# Patient Record
Sex: MALE | Race: ASIAN
[De-identification: names, ages, dates, MRNs, and addresses within clinical notes are randomized per-mention and may not be internally consistent; named-entity substitution may affect disease eponyms.]

---

## 2023-02-08 ENCOUNTER — APPOINTMENT (OUTPATIENT)
Dept: PSYCHIATRY | Facility: CLINIC | Age: 48
End: 2023-02-08

## 2023-02-08 ENCOUNTER — OUTPATIENT (OUTPATIENT)
Dept: OUTPATIENT SERVICES | Facility: HOSPITAL | Age: 48
LOS: 1 days | End: 2023-02-08
Payer: COMMERCIAL

## 2023-02-08 DIAGNOSIS — F43.10 POST-TRAUMATIC STRESS DISORDER, UNSPECIFIED: ICD-10-CM

## 2023-02-08 PROBLEM — Z00.00 ENCOUNTER FOR PREVENTIVE HEALTH EXAMINATION: Status: ACTIVE | Noted: 2023-02-08

## 2023-02-08 PROCEDURE — 90791 PSYCH DIAGNOSTIC EVALUATION: CPT

## 2023-02-08 NOTE — REASON FOR VISIT
[OK  to leave message] : OK  to leave message [Community Based Organization] : Community Based Organization [Non-[facility] Provider/Facility] : Non-[facility] Provider/Facility [Patient] : Patient [Collateral - Name/Contact Info/Relationship:___] : Collateral: [unfilled] [FreeTextEntry5] : Cantonese  [FreeTextEntry6] : Maxime  [FreeTextEntry7] : he/him/his [FreeTextEntry2] : evaluation for anxiety,depression and PTSD related to 9/11 exposure  [FreeTextEntry1] : "Since I was certified I have PTSD. I have nightmares sometimes. I have more anxiety" \par

## 2023-02-08 NOTE — HISTORY OF PRESENT ILLNESS
[Not Applicable] : Not applicable [FreeTextEntry1] : Mr. Devi identifies as a male using he/him.his pronouns. Mr. Devi is domiciled with his wife for 20 years in an apartment. 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 Mr. Devi denies any mental healt conditions prior to 9/11. Patient reports that it did not occur to him in the moment that 9/11 effected him as he reports that he was "busy". Maxime does not have any children. Mr. Devi reports that he has certified for PTSD, GERD and rhinitis. Maxime reports that he has hypertension, cholesterol and kidney cysts that is being monitored. Maxime reports that he has acid reflux, patient denies any breathing issues. Mr. Devi reports that he takes medications for each of the aforementioned ailments. He is uncertain of the names of the medications.\par Mr. Devi reports that he has a family history of dementia. Mr. Devi is an only child who was born in [place], [place]. Patient reports that he is a cigarette smoker and he smokes one pack per day. The patient is not interested in cigarette smoker. 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 The patient denies any suicidality. Patient denied a plan means and or an intent to harm himself. Patient reports that he owns a firearm and it is properly stored away. The patient denies any homicidal ideations. The patient denied any family history of suicide. The patient is currently employed as a  full-time. The patient is not on any current psychotropic medications.  [FreeTextEntry2] : The patient denied any past psychotropic hospitalizations. The patient denied any past mental health diagnosis.  [FreeTextEntry3] : The patient denied being on any past psychiatric medications.

## 2023-02-08 NOTE — DISCUSSION/SUMMARY
[Low acute suicide risk] : Low acute suicide risk [No] : No [Not clinically indicated] : Safety Plan completed/updated (for individuals at risk): Not clinically indicated [FreeTextEntry1] : Mr. Devi identifies as a male using he/him.his pronouns. Mr. Devi is domiciled with his wife for 20 years in an apartment. 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 Mr. Devi denies any mental healt conditions prior to 9/11. Patient reports that it did not occur to him in the moment that 9/11 effected him as he reports that he was "busy". Maxime does not have any children. Mr. Devi reports that he has certified for PTSD, GERD and rhinitis. Maxime reports that he has hypertension, cholesterol and kidney cysts that is being monitored. Maxime reports that he has acid reflux, patient denies any breathing issues. Mr. Devi reports that he takes medications for each of the aforementioned ailments. He is uncertain of the names of the medications.\par Mr. Devi reports that he has a family history of dementia. Mr. Devi is an only child who was born in [place], [place]. Patient reports that he is a cigarette smoker and he smokes one pack per day. The patient is not interested in cigarette smoker. 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 The patient denies any suicidality. Patient denied a plan means and or an intent to harm himself. Patient reports that he owns a firearm and it is properly stored away. The patient denies any homicidal ideations. The patient denied any family history of suicide. The patient is currently employed as a  full-time. The patient is not on any current psychotropic medications.

## 2023-02-08 NOTE — RISK ASSESSMENT
[Clinical Interview] : Clinical Interview [No] : No [Mood disorder] : mood disorder [PTSD] : PTSD [Identifies reasons for living] : identifies reasons for living [Supportive social network of family or friends] : supportive social network of family or friends [Denominational beliefs] : Methodist beliefs [Cultural, spiritual and/or moral attitudes against suicide] : cultural, spiritual and/or moral attitudes against suicide [Positive therapeutic relationships] : positive therapeutic relationships [None in the patient's lifetime] : None in the patient's lifetime [None Known] : none known [No known risk factors] : No known risk factors [Residential stability] : residential stability [Employment stability] : employment stability [Sobriety] : sobriety [Yes] : yes [de-identified] : the patient reports that he owns firearms that is stored away safely

## 2023-02-08 NOTE — ACTIVE PROBLEMS
[FreeTextEntry1] : The patient reports having depression with lack of motivation and low energy. The patient reports that he has also been having periods of anxiety that is tied to his PTSD. The patient reports having flashbacks about 9/11 as if it "happened yesterday". The patient reports having nightmares related to 9/11 and being easily triggered .

## 2023-02-08 NOTE — PSYCHOSOCIAL ASSESSMENT
[None known] : None known [All substances negative except as specified below] : All substances negative except as specified below [_____] : Route: [unfilled] [HS Diploma or GED] : HS Diploma or GED [Yes (select details below)] : Have you ever experienced this type of event? Yes [had nightmares about the event(s) or thought about the event(s) when you did not want] : had nightmares and/or unwanted thoughts about the events [tried hard not to think about the event(s) or went out of your way to avoid situations that reminded you of the event] : tried hard to avoid thinking about events or avoid situations that reminded patient of the event [has been constantly on guard, watchful, or easily startled] : has been constantly on guard, watchful, or easily startled [felt numb or detached from people, activities, or your surrounding] : has felt numb or detached from people, activities, or surroundings [Competitive and integrated employment] : Competitive and integrated employment [35 hours or more] : 35 hours or more [Financially stable] : financially stable [Client's spouse or domestic partner] : client's spouse or domestic partner [Spouse/Partner] : spouse/partner [Good] : good [Frequent Contact] : frequent contact [No] : Patient has personal representation (legal guardian, representative payee, conservatorship)? No [FreeTextEntry2] : The patient that the last time that he used the vape pen was in 2019. [FreeTextEntry3] : Mother has dementia and father is .  [felt guilty or unable to stop blaming yourself or others for the event(s) or any problems the event(s) may have caused] : has not felt guilty or unable to stop blaming self or others for event(s), or any problems the event(s) may have caused [N/A] : n/a [FreeTextEntry7] : Joyce Sheldon

## 2023-02-08 NOTE — FAMILY HISTORY
[FreeTextEntry1] : Family composition: wife, mom, aunt and one cousin in [place] only good relationship with mom. Father passed away in 2008. \par Family history and background: Patient identifies as Chinese American\par Family relationship: Patient reports that he has a good relationship with is family\par Pertinent Family Medical, MH and Substance Use History including Adult Child of Alcoholic and child of substance abuse status; history of cancer and heart disease: Patient reports having hypertension, no history of substance use, the patient denied family history of substance use, breast cancer on mothers side and thyroid cancer.\par

## 2023-02-08 NOTE — SOCIAL HISTORY
[FreeTextEntry1] : Legal Status \par Does individual Served have a Legal Guardian, rep Payee or Conservatorship?\par [ X] No\par [ ] Yes - Details: [ ] \par \par Legal Involvement history \par Does the individual have a history of or current involvement with the legal system? \par [ X] No\par [ ] Yes - Details: [ ] \par \par  Services \par Have you ever served in the ? \par [X ] No\par [ ] Yes - Details: [ ] \par \par Employment history: Patient is currently a . Patient is a member of the [employer] department for over 20 years.\par \par Developmental history: N/A \par \par Sexual hx/identity Sexual History/ Concern (include sexual orientation and other relevant information): Heterosexual cisgender male \par \par Race - ethnicity - culture information:  American born in [place] and [place]. Patient reports that he is a naturalized citizen. \par \par \par Social supports (friends, Volunteers, club, AA meeting, other meetings ) ? Patient reports that his wife, Joyce is his only social support. \par \par Meaningful Activities: video games\par \par \par Spiritual Assessment Tool - FICA\par F. What is your carmen or belief? Anglican\par Do you consider yourself spiritual or Zoroastrianism? No \par Is there something you believe in that gives meaning to your life? Joyce my wife \par I: Is it important in your life? Yes \par What influence does it have on how you take care of yourself? "I want to be a better person overall" \par How have your beliefs influenced your behavior during this illness? What role do your beliefs play in regaining your health?  "I follow the teachings of Buddha" \par C. Are you part of a spiritual or Zoroastrianism community? No \par Is this of support to you and how? N/A \par Is there a person or group of people you really love or who are really important to you? Joyce , mom and aunt \par H. We have been discussing your belief and supports. What else gives you internal support? No\par What are your sources of hope, strength, comfort and peace? "being home in my safe place" \par What do you hold on to during difficult times? No \par what sustains you and keeps you going? My wife \par A. How would you like me, your healthcare provider, to address these issues in your healthcare? Talk about it \par \par SMOKING CESSATION \par Do you Smoke?                              [ X] Yes		[ ] No\par Do you want to quit? 		[ ] Yes		[X ] No\par -ASK\par ·	Number of cigarettes   [ ] cigars [ ]  pipe bowls [20 ]  per day \par ·	Number of ST cans/ pouches per week [ 7]\par ·	Number of years used [ over 30 years ]\par ·	How soon after you wake up do you use tobacco?  [X ] within 30 minutes      [ ] more than 30 minutes \par ·	Previous quit attempts:  # of attempts [ ] longest quit period [ ] methods(s) used [ ]- Never tried to quit smoking\par How long ago was last attempt to quit  [ ] years [ ] months- NA \par ·	Reasons for wanting to quit[ ] Patient reports that he does not want to quit smoking \par -ADVISE   about the oral benefits of quitting\par -ASSESS   willingness to make a quit attempt (Stage of Change) \par 	    [ ] Precontemplation (Stop here & Reassess next visit)	[ ] Contemplation [ ] Preparation  \par -ASSIST    (depending on stage of change) \par ·	Self-Help Pamphlets & Materials\par ·	List of local community group/individual quit programs and phone help lines\par ·	Encourage a quit date (for those who are ready)\par ·	Pharmacotherapy: Nicotine gum/ Lozenge/ Patch/ Inhaler/NS/Zyban/ Chantix\par  	RX: 	[ ]  () \par -ARRANGE  Follow up if set a quit date (with permission)\par Quit Date: [ ]  Phone calls or visits:  [ ] Week 1-2  Months   [ ] 1   [ ] 3   [ ] 6   [ ] 12  \par -Yearly Reassessment    [ ] No Changes of Smoking [ ] Change of Smoking Habit (Non-Smoker to Smoker/ Smoker to Non Smoker)\par (If there is change from Non Smoker to Smoker, please fill out new BRIEF TOBACCO CESSATION INTERVENTION FORM) \par Date of Yearly Reassessment : [ ]\par Comments:  [ ]\par \par

## 2023-02-23 ENCOUNTER — OUTPATIENT (OUTPATIENT)
Dept: OUTPATIENT SERVICES | Facility: HOSPITAL | Age: 48
LOS: 1 days | End: 2023-02-23
Payer: COMMERCIAL

## 2023-02-23 ENCOUNTER — APPOINTMENT (OUTPATIENT)
Dept: PSYCHIATRY | Facility: CLINIC | Age: 48
End: 2023-02-23

## 2023-02-23 DIAGNOSIS — F43.10 POST-TRAUMATIC STRESS DISORDER, UNSPECIFIED: ICD-10-CM

## 2023-02-23 PROCEDURE — 90832 PSYTX W PT 30 MINUTES: CPT | Mod: 95

## 2023-02-23 NOTE — REASON FOR VISIT
[Patient preference] : as per patient preference [Continuing, patient seen in-person within last 12 months] : Telehealth services are continuing as patient has been seen in-person within last 12 months. [Telehealth (audio & video) - Individual/Group] : This visit was provided via telehealth using real-time 2-way audio visual technology. [Other Location: e.g. Home (Enter Location, City,State)___] : The provider was located at [unfilled]. [Home] : The patient, [unfilled], was located at home, [unfilled], at the time of the visit. [Verbal consent obtained from patient/other participant(s)] : Verbal consent for telehealth/telephonic services obtained from patient/other participant(s) [Patient] : Patient [FreeTextEntry4] : 10:09am [FreeTextEntry5] : 10:31am [FreeTextEntry2] : 2/8/2023 [FreeTextEntry3] : patient requested telehealth sessions and is appropriate for telehealth services  [FreeTextEntry1] : psychotherapy follow up

## 2023-02-23 NOTE — PLAN
[Cognitive and/or Behavior Therapy] : Cognitive and/or Behavior Therapy  [Skills training (all types)] : Skills training (all types)  [Supportive Therapy] : Supportive Therapy [FreeTextEntry2] : 1. rapport building \par 2. exploration of depression  [de-identified] : The patient attended his scheduled session with the writer via [facility]. The patient reported that he has been having a lack of energy and he has been unmotivated to anything. The writer explored these feelings further and he reported that he is not as satisfied with his job as he once was due to needing to travel further into [place] to work. The writer validated his feelings and support was rendered. The writer and the patient discussed how this can make him feel unmotivated and unfulfilled. Psychoeducation was provided to the patient regarding behavioral activation and incremental goals. This led to a discussion regarding small goals that the patient has for himself. The patient verbalized that he would like to go out with his wife. The writer commended the patient for this goal. The patient is scheduled for a psych eval with Dr. Mullen. The patient reports that he has a history of sleep apnea however, he has not been utilizing his CPAP machine. The writer strongly encouraged the patient to speak with [facility] regarding options for alternative machines. The patient is a cigarette smoker and is uninterested in smoking cessation. The writer will provide the patient with support and encouragement where needed. The patient is scheduled for a follow up visit on 3/9/2023 at 10am.  [Recommended Frequency of Visits: ____] : Recommended frequency of visits: [unfilled] [Return in ____ week(s)] : Return in [unfilled] week(s)

## 2023-02-24 ENCOUNTER — OUTPATIENT (OUTPATIENT)
Dept: OUTPATIENT SERVICES | Facility: HOSPITAL | Age: 48
LOS: 1 days | End: 2023-02-24
Payer: COMMERCIAL

## 2023-02-24 ENCOUNTER — APPOINTMENT (OUTPATIENT)
Dept: PSYCHIATRY | Facility: CLINIC | Age: 48
End: 2023-02-24
Payer: COMMERCIAL

## 2023-02-24 DIAGNOSIS — Z86.39 PERSONAL HISTORY OF OTHER ENDOCRINE, NUTRITIONAL AND METABOLIC DISEASE: ICD-10-CM

## 2023-02-24 DIAGNOSIS — Z86.79 PERSONAL HISTORY OF OTHER DISEASES OF THE CIRCULATORY SYSTEM: ICD-10-CM

## 2023-02-24 DIAGNOSIS — F43.10 POST-TRAUMATIC STRESS DISORDER, UNSPECIFIED: ICD-10-CM

## 2023-02-24 DIAGNOSIS — Z87.438 PERSONAL HISTORY OF OTHER DISEASES OF MALE GENITAL ORGANS: ICD-10-CM

## 2023-02-24 DIAGNOSIS — F33.2 MAJOR DEPRESSIVE DISORDER, RECURRENT SEVERE WITHOUT PSYCHOTIC FEATURES: ICD-10-CM

## 2023-02-24 PROCEDURE — 90792 PSYCH DIAG EVAL W/MED SRVCS: CPT

## 2023-02-24 RX ORDER — ATORVASTATIN CALCIUM 80 MG/1
80 TABLET, FILM COATED ORAL
Qty: 30 | Refills: 0 | Status: ACTIVE | COMMUNITY
Start: 2023-02-24

## 2023-02-24 RX ORDER — AMLODIPINE BESYLATE 5 MG/1
5 TABLET ORAL DAILY
Qty: 30 | Refills: 0 | Status: ACTIVE | COMMUNITY
Start: 2023-02-24

## 2023-02-24 RX ORDER — TAMSULOSIN HYDROCHLORIDE 0.4 MG/1
0.4 CAPSULE ORAL
Qty: 30 | Refills: 0 | Status: ACTIVE | COMMUNITY
Start: 2023-02-24

## 2023-02-24 NOTE — REASON FOR VISIT
[OK  to leave message] : OK  to leave message [FreeTextEntry5] : Cantonese  [FreeTextEntry6] : Maxime  [FreeTextEntry7] : he/him/his [Community Based Organization] : Community Based Organization [Non-[facility] Provider/Facility] : Non-[facility] Provider/Facility [Patient] : Patient [Collateral - Name/Contact Info/Relationship:___] : Collateral: [unfilled] [FreeTextEntry2] : evaluation for anxiety,depression and PTSD related to 9/11 exposure  [FreeTextEntry1] : "Since I was certified I have PTSD. I have nightmares sometimes. I have more anxiety" \par

## 2023-02-24 NOTE — RISK ASSESSMENT
[Clinical Interview] : Clinical Interview [No] : No [Mood disorder] : mood disorder [PTSD] : PTSD [Identifies reasons for living] : identifies reasons for living [Supportive social network of family or friends] : supportive social network of family or friends [Sabianist beliefs] : Protestant beliefs [Cultural, spiritual and/or moral attitudes against suicide] : cultural, spiritual and/or moral attitudes against suicide [Positive therapeutic relationships] : positive therapeutic relationships [None in the patient's lifetime] : None in the patient's lifetime [None Known] : none known [No known risk factors] : No known risk factors [Residential stability] : residential stability [Employment stability] : employment stability [Sobriety] : sobriety [Yes] : yes [de-identified] : the patient reports that he owns firearms that is stored away safely

## 2023-02-24 NOTE — SURGICAL HISTORY
[FreeTextEntry1] : The patient reports that he has had bunion surgery between [id number]. Patient reports having chronic foot pain.

## 2023-02-24 NOTE — PSYCHOSOCIAL ASSESSMENT
[None known] : None known [All substances negative except as specified below] : All substances negative except as specified below [_____] : Route: [unfilled] [FreeTextEntry2] : The patient that the last time that he used the vape pen was in 2019. [HS Diploma or GED] : HS Diploma or GED [FreeTextEntry3] : Mother has dementia and father is .  [Yes (select details below)] : Have you ever experienced this type of event? Yes [had nightmares about the event(s) or thought about the event(s) when you did not want] : had nightmares and/or unwanted thoughts about the events [tried hard not to think about the event(s) or went out of your way to avoid situations that reminded you of the event] : tried hard to avoid thinking about events or avoid situations that reminded patient of the event [has been constantly on guard, watchful, or easily startled] : has been constantly on guard, watchful, or easily startled [felt numb or detached from people, activities, or your surrounding] : has felt numb or detached from people, activities, or surroundings [felt guilty or unable to stop blaming yourself or others for the event(s) or any problems the event(s) may have caused] : has not felt guilty or unable to stop blaming self or others for event(s), or any problems the event(s) may have caused [Competitive and integrated employment] : Competitive and integrated employment [35 hours or more] : 35 hours or more [Financially stable] : financially stable [Client's spouse or domestic partner] : client's spouse or domestic partner [N/A] : n/a [Spouse/Partner] : spouse/partner [Good] : good [Frequent Contact] : frequent contact [No] : Patient has personal representation (legal guardian, representative payee, conservatorship)? No [FreeTextEntry7] : Joyce Sheldon

## 2023-02-24 NOTE — SOCIAL HISTORY
[FreeTextEntry1] : Legal Status \par Does individual Served have a Legal Guardian, rep Payee or Conservatorship?\par [ X] No\par [ ] Yes - Details: [ ] \par \par Legal Involvement history \par Does the individual have a history of or current involvement with the legal system? \par [ X] No\par [ ] Yes - Details: [ ] \par \par  Services \par Have you ever served in the ? \par [X ] No\par [ ] Yes - Details: [ ] \par \par Employment history: Patient is currently a . 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 Patient reports that he is a naturalized citizen. \par \par \par Social supports (friends, Volunteers, club, AA meeting, other meetings ) ? Patient reports that his wife, Joyce is his only social support. \par \par Meaningful Activities: video games\par \par \par Spiritual Assessment Tool - FICA\par F. What is your carmen or belief? Uatsdin\par Do you consider yourself spiritual or Taoist? No \par Is there something you believe in that gives meaning to your life? Joyce my wife \par I: Is it important in your life? Yes \par What influence does it have on how you take care of yourself? "I want to be a better person overall" \par How have your beliefs influenced your behavior during this illness? What role do your beliefs play in regaining your health?  "I follow the teachings of Buddha" \par C. Are you part of a spiritual or Taoist community? No \par Is this of support to you and how? N/A \par Is there a person or group of people you really love or who are really important to you? Joyce , mom and aunt \par H. We have been discussing your belief and supports. What else gives you internal support? No\par What are your sources of hope, strength, comfort and peace? "being home in my safe place" \par What do you hold on to during difficult times? No \par what sustains you and keeps you going? My wife \par A. How would you like me, your healthcare provider, to address these issues in your healthcare? Talk about it \par \par SMOKING CESSATION \par Do you Smoke?                              [ X] Yes		[ ] No\par Do you want to quit? 		[ ] Yes		[X ] No\par -ASK\par ·	Number of cigarettes   [ ] cigars [ ]  pipe bowls [20 ]  per day \par ·	Number of ST cans/ pouches per week [ 7]\par ·	Number of years used [ over 30 years ]\par ·	How soon after you wake up do you use tobacco?  [X ] within 30 minutes      [ ] more than 30 minutes \par ·	Previous quit attempts:  # of attempts [ ] longest quit period [ ] methods(s) used [ ]- Never tried to quit smoking\par How long ago was last attempt to quit  [ ] years [ ] months- NA \par ·	Reasons for wanting to quit[ ] Patient reports that he does not want to quit smoking \par -ADVISE   about the oral benefits of quitting\par -ASSESS   willingness to make a quit attempt (Stage of Change) \par 	    [ ] Precontemplation (Stop here & Reassess next visit)	[ ] Contemplation [ ] Preparation  \par -ASSIST    (depending on stage of change) \par ·	Self-Help Pamphlets & Materials\par ·	List of local community group/individual quit programs and phone help lines\par ·	Encourage a quit date (for those who are ready)\par ·	Pharmacotherapy: Nicotine gum/ Lozenge/ Patch/ Inhaler/NS/Zyban/ Chantix\par  	RX: 	[ ]  () \par -ARRANGE  Follow up if set a quit date (with permission)\par Quit Date: [ ]  Phone calls or visits:  [ ] Week 1-2  Months   [ ] 1   [ ] 3   [ ] 6   [ ] 12  \par -Yearly Reassessment    [ ] No Changes of Smoking [ ] Change of Smoking Habit (Non-Smoker to Smoker/ Smoker to Non Smoker)\par (If there is change from Non Smoker to Smoker, please fill out new BRIEF TOBACCO CESSATION INTERVENTION FORM) \par Date of Yearly Reassessment : [ ]\par Comments:  [ ]\par \par

## 2023-02-24 NOTE — HISTORY OF PRESENT ILLNESS
[FreeTextEntry1] : Pt came to his intake with his wife and she stayed in the room throughout of the intake session at pt's request.\levi Pt is a poor historian. Constantly asks his wife to answer my questions. 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 Pt denies any mental health conditions prior to 9/11), no prior IPP, no SA, never on meds, c/o feeling depressed, having anhedonia, apathy, agoraphobia, flashbacks, hypervigilance, etc. Pt was Dxd with PTSD by 9/11 [facility] and referred to us for treatment. Denies MELINA.\par Denies SI/HI/AH/PI. Pt wants to be started on meds. Risks and benefits of prozac were d/w  [FreeTextEntry2] : see above [FreeTextEntry3] : none

## 2023-02-24 NOTE — PHYSICAL EXAM
[None] : none [Intermittent] : intermittent [Slowed] : slowed [Depressed] : depressed [Anxious] : anxious [Constricted] : constricted [Underproductive] : underproductive [Soft] : soft [[place]] : concrete [WNL] : within normal limits [None Reported] : none reported [Moderate] : moderate [FreeTextEntry1] : overweight. Immature [FreeTextEntry5] : indifferent [de-identified] : poor

## 2023-02-24 NOTE — DISCUSSION/SUMMARY
[Low acute suicide risk] : Low acute suicide risk [FreeTextEntry1] : Pt came to his intake with his wife and she stayed in the room throughout of the intake session at pt's request.\par Pt is a poor historian. Constantly asks his wife to answer my questions. 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 Pt denies any mental health conditions prior to 9/11), no prior IPP, no SA, never on meds, c/o feeling depressed, having anhedonia, apathy, agoraphobia, flashbacks, hypervigilance, etc. Pt was Dxd with PTSD by 9/11 [facility] and referred to us for treatment. Denies MELINA.\par Denies SI/HI/AH/PI. Pt wants to be started on meds. Risks and benefits of prozac were d/w \par 1. Next appt in 2w tele\par 2. Does he tolerate prozac. Increase\par 3. does he have hightmares (does he need prazosin) [No] : No [Not clinically indicated] : Safety Plan completed/updated (for individuals at risk): Not clinically indicated

## 2023-02-24 NOTE — FAMILY HISTORY
[FreeTextEntry1] : Family composition: wife, mom, aunt and one cousin in [place] only good relationship with mom. Father passed away in 2008. \par Family history and background: Patient identifies as Chinese American\par Family relationship: Patient reports that he has a good relationship with is family\par Pertinent Family Medical, MH and Substance Use History including Adult Child of Alcoholic and child of substance abuse status; history of cancer and heart disease: Patient reports having hypertension, no history of substance use, the patient denied family history of substance use, breast cancer on mothers side and thyroid cancer.\par

## 2023-02-27 ENCOUNTER — NON-APPOINTMENT (OUTPATIENT)
Age: 48
End: 2023-02-27

## 2023-02-27 RX ORDER — FLUOXETINE HYDROCHLORIDE 20 MG/1
20 TABLET ORAL DAILY
Qty: 30 | Refills: 1 | Status: DISCONTINUED | COMMUNITY
Start: 2023-02-24 | End: 2023-02-27

## 2023-02-28 DIAGNOSIS — F43.10 POST-TRAUMATIC STRESS DISORDER, UNSPECIFIED: ICD-10-CM

## 2023-03-09 ENCOUNTER — APPOINTMENT (OUTPATIENT)
Dept: PSYCHIATRY | Facility: CLINIC | Age: 48
End: 2023-03-09

## 2023-03-09 ENCOUNTER — OUTPATIENT (OUTPATIENT)
Dept: OUTPATIENT SERVICES | Facility: HOSPITAL | Age: 48
LOS: 1 days | End: 2023-03-09
Payer: COMMERCIAL

## 2023-03-09 DIAGNOSIS — F43.10 POST-TRAUMATIC STRESS DISORDER, UNSPECIFIED: ICD-10-CM

## 2023-03-09 PROCEDURE — 90832 PSYTX W PT 30 MINUTES: CPT | Mod: 95

## 2023-03-09 NOTE — PLAN
[FreeTextEntry2] : 1. rapport building \par 2. exploration of depression  [Cognitive and/or Behavior Therapy] : Cognitive and/or Behavior Therapy  [Skills training (all types)] : Skills training (all types)  [Supportive Therapy] : Supportive Therapy [de-identified] : The patient attended his scheduled session with the writer via [employer].  The patient completed his psych eval with Dr. Mullen and reports that he has been taking his prescribed psychotropic medication and he has not noticed any positive changes to his mood. Psychoeducation was provided to the patient that the medication may take a few weeks to start truly working. The patient and the writer discussed ways that the patient has been able to complete  behavioral activation. Patient reported that his work schedule has not permitted him to complete any goals for himself. Writer and patient discussed ways that the patient can implement self care while he is working including taking a set lunch break. \par Patient explained that he "got into it" with one of his co workers. Patient reported that he was able to walk away. The writer commended the patient. The patient and the writer discussed further anger management techniques. The writer will continue to provide the patient with support and encouragement where needed. The patient is scheduled for a follow up visit on 3/23/2023 at 10am. Initial treatment plan discussed with the patient.  [Recommended Frequency of Visits: ____] : Recommended frequency of visits: [unfilled] [Return in ____ week(s)] : Return in [unfilled] week(s)

## 2023-03-09 NOTE — DISCUSSION/SUMMARY
[Initial Plan] : Initial Plan [Able to manage surrounding demands and opportunities] : able to manage surrounding demands and opportunities [Able to exercise self-direction] : able to exercise self-direction [Able to set and pursue goals] : able to set and pursue goals [Adherent to treatment recommendations] : adherent to treatment recommendations [Articulate] : articulate [Attempting to realize their potential] : Attempting to realize their potential [Cognitively intact] : cognitively intact [Good impulse control] : good impulse control [Insightful] : insightful [Creative] : creative [Intelligent] : intelligent [Motivated to participate in treatment] : motivated to participate in treatment [Motivated and ready for change] : motivated and ready for change [Health literacy] : health literacy [Financially stable] : financially stable [Part of a supportive marriage] : part of a supportive marriage [Part of a supportive family] : part of a supportive family [Steady employment] : steady employment [Housing stability] : housing stability [English fluency] : English fluency [Connected to healthcare] : connected to healthcare [Access to safe outdoor spaces] : access to safe outdoor spaces [Social supports] : social supports [FreeTextEntry2] : 3/8/2024 [FreeTextEntry3] : 2/24/2023 [FreeTextEntry8] : N/A [FreeTextEntry9] : Patient identifies as a Sabianist  [de-identified] : N/A [Mental Health] : Mental Health [Initial] : Initial [every ___ months] : every [unfilled] months [every ___ weeks] : every [unfilled] weeks [Improvement in symptoms as evidenced by:] : Improvement in symptoms as evidenced by: [Attainment of higher level of functioning as evidenced by:] : Attainment of higher level of functioning as evidenced by: [None - Reason others did not participate:] : None - Reason others did not participate:  [Yes] : Yes [Psychiatric Provider/Prescriber] : Psychiatric Provider/Prescriber [Therapist] : Therapist [Supervisor (if needed)] : Supervisor [de-identified] : The patient will attend medication management appointments and will take his psychotropic medications as prescribed   [de-identified] : 3/8/2024 [FreeTextEntry1] : Management of anxiety  [FreeTextEntry4] : "I would like to work on my depression"  [de-identified] : The patient will verbalize three triggers for anxiety and will verbalize and utilize at least three effective coping mechanisms for anxiety  [de-identified] : 3/8/2024 [FreeTextEntry5] : The writer will utilize CBT techniques and supportive therapy  [de-identified] :  The patient expresses improvement in performing activities of daily living and/or says progress with initial complaint symptoms. In addition, the treatment team will conduct diagnose-based screenings as needed. [de-identified] : The patient expresses improvement in performing activities of daily living and/or says progress with initial complaint symptoms. In addition, the treatment team will conduct diagnose-based screenings as needed.\par  [de-identified] : not clinically indicated

## 2023-03-09 NOTE — REASON FOR VISIT
[Patient preference] : as per patient preference [Continuing, patient seen in-person within last 12 months] : Telehealth services are continuing as patient has been seen in-person within last 12 months. [Telehealth (audio & video) - Individual/Group] : This visit was provided via telehealth using real-time 2-way audio visual technology. [Other Location: e.g. Home (Enter Location, City,State)___] : The provider was located at [unfilled]. [Home] : The patient, [unfilled], was located at home, [unfilled], at the time of the visit. [Verbal consent obtained from patient/other participant(s)] : Verbal consent for telehealth/telephonic services obtained from patient/other participant(s) [FreeTextEntry4] : 10am [FreeTextEntry5] : 10:22am  [FreeTextEntry2] : 2/24/2023 [FreeTextEntry3] : patient requested telehealth sessions and is appropriate for telehealth services  [Patient] : Patient [FreeTextEntry1] : psychotherapy follow up

## 2023-03-09 NOTE — DISCUSSION/SUMMARY
[Initial Plan] : Initial Plan [Able to manage surrounding demands and opportunities] : able to manage surrounding demands and opportunities [Able to exercise self-direction] : able to exercise self-direction [Able to set and pursue goals] : able to set and pursue goals [Adherent to treatment recommendations] : adherent to treatment recommendations [Articulate] : articulate [Attempting to realize their potential] : Attempting to realize their potential [Cognitively intact] : cognitively intact [Good impulse control] : good impulse control [Insightful] : insightful [Creative] : creative [Intelligent] : intelligent [Motivated to participate in treatment] : motivated to participate in treatment [Motivated and ready for change] : motivated and ready for change [Health literacy] : health literacy [Financially stable] : financially stable [Part of a supportive marriage] : part of a supportive marriage [Part of a supportive family] : part of a supportive family [Steady employment] : steady employment [Housing stability] : housing stability [English fluency] : English fluency [Connected to healthcare] : connected to healthcare [Access to safe outdoor spaces] : access to safe outdoor spaces [Social supports] : social supports [FreeTextEntry2] : 3/8/2024 [FreeTextEntry3] : 2/24/2023 [FreeTextEntry8] : N/A [FreeTextEntry9] : Patient identifies as a Gnosticism  [de-identified] : N/A [Mental Health] : Mental Health [Initial] : Initial [every ___ months] : every [unfilled] months [every ___ weeks] : every [unfilled] weeks [Improvement in symptoms as evidenced by:] : Improvement in symptoms as evidenced by: [Attainment of higher level of functioning as evidenced by:] : Attainment of higher level of functioning as evidenced by: [None - Reason others did not participate:] : None - Reason others did not participate:  [Yes] : Yes [Psychiatric Provider/Prescriber] : Psychiatric Provider/Prescriber [Therapist] : Therapist [Supervisor (if needed)] : Supervisor [de-identified] : The patient will attend medication management appointments and will take his psychotropic medications as prescribed   [de-identified] : 3/8/2024 [FreeTextEntry1] : Management of anxiety  [FreeTextEntry4] : "I would like to work on my depression"  [de-identified] : The patient will verbalize three triggers for anxiety and will verbalize and utilize at least three effective coping mechanisms for anxiety  [de-identified] : 3/8/2024 [FreeTextEntry5] : The writer will utilize CBT techniques and supportive therapy  [de-identified] :  The patient expresses improvement in performing activities of daily living and/or says progress with initial complaint symptoms. In addition, the treatment team will conduct diagnose-based screenings as needed. [de-identified] : The patient expresses improvement in performing activities of daily living and/or says progress with initial complaint symptoms. In addition, the treatment team will conduct diagnose-based screenings as needed.\par  [de-identified] : not clinically indicated

## 2023-03-09 NOTE — PLAN
[FreeTextEntry2] : 1. rapport building \par 2. exploration of depression  [Cognitive and/or Behavior Therapy] : Cognitive and/or Behavior Therapy  [Skills training (all types)] : Skills training (all types)  [Supportive Therapy] : Supportive Therapy [de-identified] : The patient attended his scheduled session with the writer via [employer].  The patient completed his psych eval with Dr. Mullen and reports that he has been taking his prescribed psychotropic medication and he has not noticed any positive changes to his mood. Psychoeducation was provided to the patient that the medication may take a few weeks to start truly working. The patient and the writer discussed ways that the patient has been able to complete  behavioral activation. Patient reported that his work schedule has not permitted him to complete any goals for himself. Writer and patient discussed ways that the patient can implement self care while he is working including taking a set lunch break. \par Patient explained that he "got into it" with one of his co workers. Patient reported that he was able to walk away. The writer commended the patient. The patient and the writer discussed further anger management techniques. The writer will continue to provide the patient with support and encouragement where needed. The patient is scheduled for a follow up visit on 3/23/2023 at 10am. Initial treatment plan discussed with the patient.  [Recommended Frequency of Visits: ____] : Recommended frequency of visits: [unfilled] [Return in ____ week(s)] : Return in [unfilled] week(s)

## 2023-03-16 ENCOUNTER — OUTPATIENT (OUTPATIENT)
Dept: OUTPATIENT SERVICES | Facility: HOSPITAL | Age: 48
LOS: 1 days | End: 2023-03-16
Payer: COMMERCIAL

## 2023-03-16 ENCOUNTER — APPOINTMENT (OUTPATIENT)
Dept: PSYCHIATRY | Facility: CLINIC | Age: 48
End: 2023-03-16
Payer: COMMERCIAL

## 2023-03-16 DIAGNOSIS — F43.10 POST-TRAUMATIC STRESS DISORDER, UNSPECIFIED: ICD-10-CM

## 2023-03-16 PROCEDURE — 99214 OFFICE O/P EST MOD 30 MIN: CPT | Mod: 95

## 2023-03-16 NOTE — DISCUSSION/SUMMARY
[FreeTextEntry1] : Solo/tele F/U visit for 30mins\par Pt reports being c/w meds and denies having side effects. He goes to work now and comes home very tired. Doesn't think that meds work yet, but says that he stopped eating all day. His sleep varies. He still experience nightmares, but says that he doesn't want meds for this matter now. Reports having anxiety when he drives and endorses to having agoraphobia.\par Denies SI/HI//AH/PI. 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 Pt denies any mental health conditions prior to 9/11), no prior IPP, no SA, never on meds, c/o feeling depressed, having anhedonia, apathy, agoraphobia, flashbacks, hypervigilance, etc. Pt was Dxd with PTSD by 9/11 [facility] and referred to us for treatment. Denies MELINA.\par Denies SI/HI/AH/PI. Pt wants to be started on meds. Risks and benefits of prozac were d/w \par 1. Next appt in 2w tele\par 2. Does he tolerate 40 mg prozac.Don't increase this time\par 3. does he have hightmares (does he need prazosin)\par 4. Depression and anxiety the same? Agoraphobia? [Low acute suicide risk] : Low acute suicide risk [No] : No [Not clinically indicated] : Safety Plan completed/updated (for individuals at risk): Not clinically indicated

## 2023-03-16 NOTE — SURGICAL HISTORY
[FreeTextEntry1] : The patient reports that he has had bunion surgery between 2509-2703. Patient reports having chronic foot pain.  Glycopyrrolate Pregnancy And Lactation Text: This medication is Pregnancy Category B and is considered safe during pregnancy. It is unknown if it is excreted breast milk.

## 2023-03-16 NOTE — FAMILY HISTORY
[FreeTextEntry1] : Family composition: wife, mom, aunt and one cousin in [place] only good relationship with mom. Father passed away in 2008. \par Family history and background: Patient identifies as Chinese American\par Family relationship: Patient reports that he has a good relationship with is family\par Pertinent Family Medical, MH and Substance Use History including Adult Child of Alcoholic and child of substance abuse status; history of cancer and heart disease: Patient reports having hypertension, no history of substance use, the patient denied family history of substance use, breast cancer on mothers side and thyroid cancer.\par

## 2023-03-16 NOTE — PHYSICAL EXAM
[None] : none [Average] : average [Intermittent] : intermittent [Slowed] : slowed [Cooperative] : cooperative [Depressed] : depressed [Anxious] : anxious [Constricted] : constricted [Clear] : clear [Linear/Goal Directed] : linear/goal directed [WNL] : within normal limits [None Reported] : none reported [Mild] : mild [Moderate] : moderate [de-identified] : poor

## 2023-03-16 NOTE — SOCIAL HISTORY
[FreeTextEntry1] : Legal Status \par Does individual Served have a Legal Guardian, rep Payee or Conservatorship?\par [ X] No\par [ ] Yes - Details: [ ] \par \par Legal Involvement history \par Does the individual have a history of or current involvement with the legal system? \par [ X] No\par [ ] Yes - Details: [ ] \par \par  Services \par Have you ever served in the ? \par [X ] No\par [ ] Yes - Details: [ ] \par \par Employment history: Patient is currently a . 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 Patient reports that he is a naturalized citizen. \par \par \par Social supports (friends, Volunteers, club, AA meeting, other meetings ) ? Patient reports that his wife, Joyce is his only social support. \par \par Meaningful Activities: video games\par \par \par Spiritual Assessment Tool - FICA\par F. What is your carmen or belief? Scientologist\par Do you consider yourself spiritual or Mosque? No \par Is there something you believe in that gives meaning to your life? Joyce my wife \par I: Is it important in your life? Yes \par What influence does it have on how you take care of yourself? "I want to be a better person overall" \par How have your beliefs influenced your behavior during this illness? What role do your beliefs play in regaining your health?  "I follow the teachings of Buddha" \par C. Are you part of a spiritual or Mosque community? No \par Is this of support to you and how? N/A \par Is there a person or group of people you really love or who are really important to you? Joyce , mom and aunt \par H. We have been discussing your belief and supports. What else gives you internal support? No\par What are your sources of hope, strength, comfort and peace? "being home in my safe place" \par What do you hold on to during difficult times? No \par what sustains you and keeps you going? My wife \par A. How would you like me, your healthcare provider, to address these issues in your healthcare? Talk about it \par \par SMOKING CESSATION \par Do you Smoke?                              [ X] Yes		[ ] No\par Do you want to quit? 		[ ] Yes		[X ] No\par -ASK\par ·	Number of cigarettes   [ ] cigars [ ]  pipe bowls [20 ]  per day \par ·	Number of ST cans/ pouches per week [ 7]\par ·	Number of years used [ over 30 years ]\par ·	How soon after you wake up do you use tobacco?  [X ] within 30 minutes      [ ] more than 30 minutes \par ·	Previous quit attempts:  # of attempts [ ] longest quit period [ ] methods(s) used [ ]- Never tried to quit smoking\par How long ago was last attempt to quit  [ ] years [ ] months- NA \par ·	Reasons for wanting to quit[ ] Patient reports that he does not want to quit smoking \par -ADVISE   about the oral benefits of quitting\par -ASSESS   willingness to make a quit attempt (Stage of Change) \par 	    [ ] Precontemplation (Stop here & Reassess next visit)	[ ] Contemplation [ ] Preparation  \par -ASSIST    (depending on stage of change) \par ·	Self-Help Pamphlets & Materials\par ·	List of local community group/individual quit programs and phone help lines\par ·	Encourage a quit date (for those who are ready)\par ·	Pharmacotherapy: Nicotine gum/ Lozenge/ Patch/ Inhaler/NS/Zyban/ Chantix\par  	RX: 	[ ]  () \par -ARRANGE  Follow up if set a quit date (with permission)\par Quit Date: [ ]  Phone calls or visits:  [ ] Week 1-2  Months   [ ] 1   [ ] 3   [ ] 6   [ ] 12  \par -Yearly Reassessment    [ ] No Changes of Smoking [ ] Change of Smoking Habit (Non-Smoker to Smoker/ Smoker to Non Smoker)\par (If there is change from Non Smoker to Smoker, please fill out new BRIEF TOBACCO CESSATION INTERVENTION FORM) \par Date of Yearly Reassessment : [ ]\par Comments:  [ ]\par \par

## 2023-03-16 NOTE — HISTORY OF PRESENT ILLNESS
[FreeTextEntry1] : Solo/tele F/U visit for 30mins\par Pt reports being c/w meds and denies having side effects. He goes to work now and comes home very tired. Doesn't think that meds work yet, but says that he stopped eating all day. His sleep varies. He still experience nightmares, but says that he doesn't want meds for this matter now. Reports having anxiety when he drives and endorses to having agoraphobia.\par Denies SI/HI//AH/PI. Denies drugs and ETOH.\par Education/supportive/skill therapy are provided. [FreeTextEntry2] : see above [FreeTextEntry3] : none

## 2023-03-16 NOTE — REASON FOR VISIT
[OK  to leave message] : OK  to leave message [FreeTextEntry5] : Cantonese  [FreeTextEntry6] : Maxime  [FreeTextEntry7] : he/him/his [Community Based Organization] : Community Based Organization [Non-[facility] Health Provider/Facility] : Non-[facility] Health Provider/Facility [Patient] : Patient [Collateral - Name/Contact Info/Relationship:___] : Collateral: [unfilled] [FreeTextEntry2] : evaluation for anxiety,depression and PTSD related to 9/11 exposure  [FreeTextEntry1] : "Since I was certified I have PTSD. I have nightmares sometimes. I have more anxiety" \par

## 2023-03-16 NOTE — RISK ASSESSMENT
[No, patient denies ideation or behavior] : No, patient denies ideation or behavior [Clinical Interview] : Clinical Interview [No] : No [Mood disorder] : mood disorder [PTSD] : PTSD [Identifies reasons for living] : identifies reasons for living [Supportive social network of family or friends] : supportive social network of family or friends [Scientology beliefs] : Adventist beliefs [Cultural, spiritual and/or moral attitudes against suicide] : cultural, spiritual and/or moral attitudes against suicide [Positive therapeutic relationships] : positive therapeutic relationships [None in the patient's lifetime] : None in the patient's lifetime [None Known] : none known [No known risk factors] : No known risk factors [Residential stability] : residential stability [Employment stability] : employment stability [Sobriety] : sobriety [Yes] : yes [de-identified] : the patient reports that he owns firearms that is stored away safely

## 2023-03-17 DIAGNOSIS — F43.10 POST-TRAUMATIC STRESS DISORDER, UNSPECIFIED: ICD-10-CM

## 2023-03-23 ENCOUNTER — APPOINTMENT (OUTPATIENT)
Dept: PSYCHIATRY | Facility: CLINIC | Age: 48
End: 2023-03-23

## 2023-03-23 NOTE — DISCUSSION/SUMMARY
[FreeTextEntry1] : Patient's wife called and stated that patient would not be able to attend scheduled appointment with the writer. A reason was not provided. The patient's appointment has been rescheduled for 3/24/2023 at 12pm.

## 2023-03-24 ENCOUNTER — OUTPATIENT (OUTPATIENT)
Dept: OUTPATIENT SERVICES | Facility: HOSPITAL | Age: 48
LOS: 1 days | End: 2023-03-24
Payer: COMMERCIAL

## 2023-03-24 ENCOUNTER — APPOINTMENT (OUTPATIENT)
Dept: PSYCHIATRY | Facility: CLINIC | Age: 48
End: 2023-03-24

## 2023-03-24 DIAGNOSIS — F43.10 POST-TRAUMATIC STRESS DISORDER, UNSPECIFIED: ICD-10-CM

## 2023-03-24 PROCEDURE — 90832 PSYTX W PT 30 MINUTES: CPT | Mod: 95

## 2023-03-24 NOTE — PLAN
[Cognitive and/or Behavior Therapy] : Cognitive and/or Behavior Therapy  [Skills training (all types)] : Skills training (all types)  [Supportive Therapy] : Supportive Therapy [Recommended Frequency of Visits: ____] : Recommended frequency of visits: [unfilled] [Return in ____ week(s)] : Return in [unfilled] week(s) [FreeTextEntry2] : Goal #1- Management of depression \par Obj #1- The patient will identify three triggers for his depression and will verbalize and utilize three effective coping mechanisms \par Obj #2-  The patient will attend medication management appointments and will take his psychotropic medications as prescribed \par \par Goal #2- Management of anxiety \par Obj #2-  The patient will verbalize three triggers for anxiety and will verbalize and utilize at least three effective coping mechanisms for anxiety [de-identified] : The patient attended his scheduled session with the writer via [employer]. The patient explained that he has been doing well and there has not been any changes to his anxiety and or depression. The patient noted some mood improvement with the initiation of his psychotropic medication. The patient noted that he had an argument with his supervisor and he was able to walk away and not continue engaging. The writer commended the patient. 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 The patient and the writer discussed taking a proper lunch break. Patient noted some barriers to this. The patient explained that he has begun the process of planning a vacation with him and his wife. Patient explained that he is hopeful that some time away from work will be good for him. This led to a discussion regarding how planned activity can help him to have something to look forward to. Ongoing behavioral activation was discussed with the patient. The patient reports medication adherence. The writer will continue to provide the patient with support and encouragement where needed. The patient is scheduled for a follow up visit on 4/13/2023 at 10:30am. The writer did not have the patient's preferred time frame available.

## 2023-03-24 NOTE — REASON FOR VISIT
[Patient preference] : as per patient preference [Continuing, patient seen in-person within last 12 months] : Telehealth services are continuing as patient has been seen in-person within last 12 months. [Telehealth (audio & video) - Individual/Group] : This visit was provided via telehealth using real-time 2-way audio visual technology. [Medical Office: ([facility])___] : The provider was located at the medical office in [unfilled]. [Home] : The patient, [unfilled], was located at home, [unfilled], at the time of the visit. [Verbal consent obtained from patient/other participant(s)] : Verbal consent for telehealth/telephonic services obtained from patient/other participant(s) [Patient] : Patient [FreeTextEntry4] : 12:12pm  [FreeTextEntry5] : 12:33pm  [FreeTextEntry3] : patient requested telehealth sessions and is appropriate for telehealth services  [FreeTextEntry2] : 2/24/2023 [FreeTextEntry1] : psychotherapy follow up

## 2023-03-30 ENCOUNTER — OUTPATIENT (OUTPATIENT)
Dept: OUTPATIENT SERVICES | Facility: HOSPITAL | Age: 48
LOS: 1 days | End: 2023-03-30
Payer: COMMERCIAL

## 2023-03-30 ENCOUNTER — APPOINTMENT (OUTPATIENT)
Dept: PSYCHIATRY | Facility: CLINIC | Age: 48
End: 2023-03-30
Payer: COMMERCIAL

## 2023-03-30 DIAGNOSIS — F43.10 POST-TRAUMATIC STRESS DISORDER, UNSPECIFIED: ICD-10-CM

## 2023-03-30 PROCEDURE — 99214 OFFICE O/P EST MOD 30 MIN: CPT | Mod: 95

## 2023-03-30 NOTE — PHYSICAL EXAM
[None] : none [Average] : average [Intermittent] : intermittent [Slowed] : slowed [Cooperative] : cooperative [Depressed] : depressed [Anxious] : anxious [Constricted] : constricted [Clear] : clear [Linear/Goal Directed] : linear/goal directed [WNL] : within normal limits [None Reported] : none reported [Mild] : mild [Moderate] : moderate [de-identified] : poor

## 2023-03-30 NOTE — SURGICAL HISTORY
[FreeTextEntry1] : The patient reports that he has had bunion surgery between [id number]. Patient reports having chronic foot pain.

## 2023-03-30 NOTE — REASON FOR VISIT
[OK  to leave message] : OK  to leave message [FreeTextEntry5] : Cantonese  [FreeTextEntry6] : Maxime  [FreeTextEntry7] : he/him/his [Community Based Organization] : Community Based Organization [Non-[facility] Provider/Facility] : Non-[facility] Provider/Facility [Patient] : Patient [Collateral - Name/Contact Info/Relationship:___] : Collateral: [unfilled] [FreeTextEntry2] : evaluation for anxiety,depression and PTSD related to 9/11 exposure  [FreeTextEntry1] : "Since I was certified I have PTSD. I have nightmares sometimes. I have more anxiety" \par

## 2023-03-30 NOTE — RISK ASSESSMENT
[No, patient denies ideation or behavior] : No, patient denies ideation or behavior [Clinical Interview] : Clinical Interview [No] : No [Mood disorder] : mood disorder [PTSD] : PTSD [Identifies reasons for living] : identifies reasons for living [Supportive social network of family or friends] : supportive social network of family or friends [Rastafarian beliefs] : Moravian beliefs [Cultural, spiritual and/or moral attitudes against suicide] : cultural, spiritual and/or moral attitudes against suicide [Positive therapeutic relationships] : positive therapeutic relationships [None in the patient's lifetime] : None in the patient's lifetime [None Known] : none known [No known risk factors] : No known risk factors [Residential stability] : residential stability [Employment stability] : employment stability [Sobriety] : sobriety [Yes] : yes [de-identified] : the patient reports that he owns firearms that is stored away safely

## 2023-03-30 NOTE — FAMILY HISTORY
[FreeTextEntry1] : Family composition: wife, mom, aunt and one cousin in [place] only good relationship with mom. Father passed away in 2008. \par Family history and background: Patient identifies as Chinese American\par Family relationship: Patient reports that he has a good relationship with is family\par Pertinent Family Medical, MH and Substance Use History including Adult Child of Alcoholic and child of substance abuse status; history of cancer and heart disease: Patient reports having hypertension, no history of substance use, the patient denied family history of substance use, breast cancer on mothers side and thyroid cancer.\par

## 2023-03-30 NOTE — DISCUSSION/SUMMARY
[FreeTextEntry1] : Solo/tele F/U visit for 30mins\par Pt spoke about his job and his problems with a new supervisor, who was very rude to the pt, yelled at him. Pt felt very stressed after that  and even needed to take 2 days off to avoid dealing with the supervisor. He will go back to work on saturday and sunday, just to work with a different supervisor. Pt also had nightmares related to this situation. Supportive and skill therapy were discussed with the pt. \par Pt reports being c/w meds and denies having side effects.  Doesn't think that meds work yet, but says that he stopped eating all day. His sleep varies. He still experience nightmares, but says that he doesn't want meds for this matter now. Reports having anxiety when he drives and endorses to having agoraphobia.\par Denies SI/HI//AH/PI. 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 Pt denies any mental health conditions prior to 9/11), no prior IPP, no SA, never on meds, c/o feeling depressed, having anhedonia, apathy, agoraphobia, flashbacks, hypervigilance, etc. Pt was Dxd with PTSD by 9/11 [facility] and referred to us for treatment. Denies MELINA.\par  Pt wants to be started on meds. Risks and benefits of prozac were d/w \par 1. Next appt in 1m tele\par 2. Does he tolerate 40 mg prozac.Increase?\par 3. 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 Depression and anxiety the same? Agoraphobia?\par 5. How is his new supervisor (he was screaming at the pt) [Low acute suicide risk] : Low acute suicide risk [No] : No [Not clinically indicated] : Safety Plan completed/updated (for individuals at risk): Not clinically indicated

## 2023-03-30 NOTE — SOCIAL HISTORY
[FreeTextEntry1] : Legal Status \par Does individual Served have a Legal Guardian, rep Payee or Conservatorship?\par [ X] No\par [ ] Yes - Details: [ ] \par \par Legal Involvement history \par Does the individual have a history of or current involvement with the legal system? \par [ X] No\par [ ] Yes - Details: [ ] \par \par  Services \par Have you ever served in the ? \par [X ] No\par [ ] Yes - Details: [ ] \par \par Employment history: Patient is currently a . 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 Patient reports that he is a naturalized citizen. \par \par \par Social supports (friends, Volunteers, club, AA meeting, other meetings ) ? Patient reports that his wife, Joyce is his only social support. \par \par Meaningful Activities: video games\par \par \par Spiritual Assessment Tool - FICA\par F. What is your carmen or belief? Tenriism\par Do you consider yourself spiritual or Rastafarian? No \par Is there something you believe in that gives meaning to your life? Joyce my wife \par I: Is it important in your life? Yes \par What influence does it have on how you take care of yourself? "I want to be a better person overall" \par How have your beliefs influenced your behavior during this illness? What role do your beliefs play in regaining your health?  "I follow the teachings of Buddha" \par C. Are you part of a spiritual or Rastafarian community? No \par Is this of support to you and how? N/A \par Is there a person or group of people you really love or who are really important to you? Joyce , mom and aunt \par H. We have been discussing your belief and supports. What else gives you internal support? No\par What are your sources of hope, strength, comfort and peace? "being home in my safe place" \par What do you hold on to during difficult times? No \par what sustains you and keeps you going? My wife \par A. How would you like me, your healthcare provider, to address these issues in your healthcare? Talk about it \par \par SMOKING CESSATION \par Do you Smoke?                              [ X] Yes		[ ] No\par Do you want to quit? 		[ ] Yes		[X ] No\par -ASK\par ·	Number of cigarettes   [ ] cigars [ ]  pipe bowls [20 ]  per day \par ·	Number of ST cans/ pouches per week [ 7]\par ·	Number of years used [ over 30 years ]\par ·	How soon after you wake up do you use tobacco?  [X ] within 30 minutes      [ ] more than 30 minutes \par ·	Previous quit attempts:  # of attempts [ ] longest quit period [ ] methods(s) used [ ]- Never tried to quit smoking\par How long ago was last attempt to quit  [ ] years [ ] months- NA \par ·	Reasons for wanting to quit[ ] Patient reports that he does not want to quit smoking \par -ADVISE   about the oral benefits of quitting\par -ASSESS   willingness to make a quit attempt (Stage of Change) \par 	    [ ] Precontemplation (Stop here & Reassess next visit)	[ ] Contemplation [ ] Preparation  \par -ASSIST    (depending on stage of change) \par ·	Self-Help Pamphlets & Materials\par ·	List of local community group/individual quit programs and phone help lines\par ·	Encourage a quit date (for those who are ready)\par ·	Pharmacotherapy: Nicotine gum/ Lozenge/ Patch/ Inhaler/NS/Zyban/ Chantix\par  	RX: 	[ ]  () \par -ARRANGE  Follow up if set a quit date (with permission)\par Quit Date: [ ]  Phone calls or visits:  [ ] Week 1-2  Months   [ ] 1   [ ] 3   [ ] 6   [ ] 12  \par -Yearly Reassessment    [ ] No Changes of Smoking [ ] Change of Smoking Habit (Non-Smoker to Smoker/ Smoker to Non Smoker)\par (If there is change from Non Smoker to Smoker, please fill out new BRIEF TOBACCO CESSATION INTERVENTION FORM) \par Date of Yearly Reassessment : [ ]\par Comments:  [ ]\par \par

## 2023-03-30 NOTE — HISTORY OF PRESENT ILLNESS
[FreeTextEntry1] : Solo/tele F/U visit for 30mins\par Pt spoke about his job and his problems with a new supervisor, who was very rude to the pt, yelled at him. Pt felt very stressed after that  and even needed to take 2 days off to avoid dealing with the supervisor. He will go back to work on saturday and sunday, just to work with a different supervisor. Pt also had nightmares related to this situation. Supportive and skill therapy were discussed with the pt. \par Pt reports being c/w meds and denies having side effects.  Doesn't think that meds work yet, but says that he stopped eating all day. His sleep varies. He still experience nightmares, but says that he doesn't want meds for this matter now. Reports having anxiety when he drives and endorses to having agoraphobia.\par Denies SI/HI//AH/PI. Denies drugs and ETOH. [FreeTextEntry2] : see above [FreeTextEntry3] : none

## 2023-03-31 DIAGNOSIS — F43.10 POST-TRAUMATIC STRESS DISORDER, UNSPECIFIED: ICD-10-CM

## 2023-04-13 ENCOUNTER — APPOINTMENT (OUTPATIENT)
Dept: PSYCHIATRY | Facility: CLINIC | Age: 48
End: 2023-04-13

## 2023-04-13 ENCOUNTER — OUTPATIENT (OUTPATIENT)
Dept: OUTPATIENT SERVICES | Facility: HOSPITAL | Age: 48
LOS: 1 days | End: 2023-04-13
Payer: COMMERCIAL

## 2023-04-13 DIAGNOSIS — F43.10 POST-TRAUMATIC STRESS DISORDER, UNSPECIFIED: ICD-10-CM

## 2023-04-13 PROCEDURE — 90832 PSYTX W PT 30 MINUTES: CPT | Mod: 95

## 2023-04-13 NOTE — PLAN
[Cognitive and/or Behavior Therapy] : Cognitive and/or Behavior Therapy  [Skills training (all types)] : Skills training (all types)  [Supportive Therapy] : Supportive Therapy [FreeTextEntry2] : Goal #1- Management of depression \par Obj #1- The patient will identify three triggers for his depression and will verbalize and utilize three effective coping mechanisms \par Obj #2-  The patient will attend medication management appointments and will take his psychotropic medications as prescribed \par \par Goal #2- Management of anxiety \par Obj #2-  The patient will verbalize three triggers for anxiety and will verbalize and utilize at least three effective coping mechanisms for anxiety [de-identified] : The patient attended his scheduled session with the writer via [employer]. The patient noted that there has not been any changes to his feelings of anxiety and or depression. The writer completed the adult PHQ9 scale and the patient scored a 9 indicating mild depression. The writer explored triggers of depression and anxiety with the patient and the patient was unable to determine any triggers. The writer introduced a "trigger log" to the patient. The writer and the patient discussed how logging and taking note of particular instances where he notices an increase in anxiety and or depression. Patient and the writer discussed behavioral activation and the patient noted that he has been working and on his days off he tends to watch television and sleep. Psychoeducation was provided to the patient regarding how behavioral activation engagement can help with feelings of anxiety and depression. Writer encouraged the patient to set small goals for himself that he can accomplish throughout the day. The patient notes that he continues to have various nightmares in which his wife notes that he wakes up screaming. The patient reports that he is not willing to address this with medication. Patient reports medication adherence. The writer will continue to provide the patient with support and encouragement where needed. The patient is scheduled for a medication management appointment on 5/4/2023 at 11am.  [Recommended Frequency of Visits: ____] : Recommended frequency of visits: [unfilled] [Return in ____ week(s)] : Return in [unfilled] week(s)

## 2023-04-13 NOTE — REASON FOR VISIT
01-Apr-2021 [Continuing, patient seen in-person within last 12 months] : Telehealth services are continuing as patient has been seen in-person within last 12 months. [Telehealth (audio & video) - Individual/Group] : This visit was provided via telehealth using real-time 2-way audio visual technology. [Other Location: e.g. Home (Enter Location, City,State)___] : The provider was located at [unfilled]. [Home] : The patient, [unfilled], was located at home, [unfilled], at the time of the visit. [Verbal consent obtained from patient/other participant(s)] : Verbal consent for telehealth/telephonic services obtained from patient/other participant(s) [Patient] : Patient [FreeTextEntry4] : 10:31am  [FreeTextEntry5] : 10:47am  [FreeTextEntry2] : 2/24/2023 [FreeTextEntry3] : patient requested telehealth sessions and is appropriate for telehealth services  [FreeTextEntry1] : psychotherapy follow up

## 2023-05-04 ENCOUNTER — APPOINTMENT (OUTPATIENT)
Dept: PSYCHIATRY | Facility: CLINIC | Age: 48
End: 2023-05-04
Payer: COMMERCIAL

## 2023-05-04 ENCOUNTER — OUTPATIENT (OUTPATIENT)
Dept: OUTPATIENT SERVICES | Facility: HOSPITAL | Age: 48
LOS: 1 days | End: 2023-05-04
Payer: COMMERCIAL

## 2023-05-04 ENCOUNTER — TRANSCRIPTION ENCOUNTER (OUTPATIENT)
Age: 48
End: 2023-05-04

## 2023-05-04 DIAGNOSIS — F33.1 MAJOR DEPRESSIVE DISORDER, RECURRENT, MODERATE: ICD-10-CM

## 2023-05-04 DIAGNOSIS — F43.10 POST-TRAUMATIC STRESS DISORDER, UNSPECIFIED: ICD-10-CM

## 2023-05-04 PROCEDURE — 99213 OFFICE O/P EST LOW 20 MIN: CPT | Mod: 95

## 2023-05-04 PROCEDURE — 90832 PSYTX W PT 30 MINUTES: CPT | Mod: 95

## 2023-05-04 NOTE — FAMILY HISTORY
[FreeTextEntry1] : Family composition: wife, mom, aunt and one cousin in [place] only good relationship with mom. Father passed away in 2008. \par Family history and background: Patient identifies as Chinese American\par Family relationship: Patient reports that he has a good relationship with is family\par Pertinent Family Medical, MH and Substance Use History including Adult Child of Alcoholic and child of substance abuse status; history of cancer and heart disease: Patient reports having hypertension, no history of substance use, the patient denied family history of substance use, breast cancer on mothers side and thyroid cancer.\par

## 2023-05-04 NOTE — SOCIAL HISTORY
[FreeTextEntry1] : Legal Status \par Does individual Served have a Legal Guardian, rep Payee or Conservatorship?\par [ X] No\par [ ] Yes - Details: [ ] \par \par Legal Involvement history \par Does the individual have a history of or current involvement with the legal system? \par [ X] No\par [ ] Yes - Details: [ ] \par \par  Services \par Have you ever served in the ? \par [X ] No\par [ ] Yes - Details: [ ] \par \par Employment history: Patient is currently a . 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 Patient reports that he is a naturalized citizen. \par \par \par Social supports (friends, Volunteers, club, AA meeting, other meetings ) ? Patient reports that his wife, Joyce is his only social support. \par \par Meaningful Activities: video games\par \par \par Spiritual Assessment Tool - FICA\par F. What is your carmen or belief? Mormonism\par Do you consider yourself spiritual or Adventist? No \par Is there something you believe in that gives meaning to your life? Joyce my wife \par I: Is it important in your life? Yes \par What influence does it have on how you take care of yourself? "I want to be a better person overall" \par How have your beliefs influenced your behavior during this illness? What role do your beliefs play in regaining your health?  "I follow the teachings of Buddha" \par C. Are you part of a spiritual or Adventist community? No \par Is this of support to you and how? N/A \par Is there a person or group of people you really love or who are really important to you? Joyce , mom and aunt \par H. We have been discussing your belief and supports. What else gives you internal support? No\par What are your sources of hope, strength, comfort and peace? "being home in my safe place" \par What do you hold on to during difficult times? No \par what sustains you and keeps you going? My wife \par A. How would you like me, your healthcare provider, to address these issues in your healthcare? Talk about it \par \par SMOKING CESSATION \par Do you Smoke?                              [ X] Yes		[ ] No\par Do you want to quit? 		[ ] Yes		[X ] No\par -ASK\par ·	Number of cigarettes   [ ] cigars [ ]  pipe bowls [20 ]  per day \par ·	Number of ST cans/ pouches per week [ 7]\par ·	Number of years used [ over 30 years ]\par ·	How soon after you wake up do you use tobacco?  [X ] within 30 minutes      [ ] more than 30 minutes \par ·	Previous quit attempts:  # of attempts [ ] longest quit period [ ] methods(s) used [ ]- Never tried to quit smoking\par How long ago was last attempt to quit  [ ] years [ ] months- NA \par ·	Reasons for wanting to quit[ ] Patient reports that he does not want to quit smoking \par -ADVISE   about the oral benefits of quitting\par -ASSESS   willingness to make a quit attempt (Stage of Change) \par 	    [ ] Precontemplation (Stop here & Reassess next visit)	[ ] Contemplation [ ] Preparation  \par -ASSIST    (depending on stage of change) \par ·	Self-Help Pamphlets & Materials\par ·	List of local community group/individual quit programs and phone help lines\par ·	Encourage a quit date (for those who are ready)\par ·	Pharmacotherapy: Nicotine gum/ Lozenge/ Patch/ Inhaler/NS/Zyban/ Chantix\par  	RX: 	[ ]  () \par -ARRANGE  Follow up if set a quit date (with permission)\par Quit Date: [ ]  Phone calls or visits:  [ ] Week 1-2  Months   [ ] 1   [ ] 3   [ ] 6   [ ] 12  \par -Yearly Reassessment    [ ] No Changes of Smoking [ ] Change of Smoking Habit (Non-Smoker to Smoker/ Smoker to Non Smoker)\par (If there is change from Non Smoker to Smoker, please fill out new BRIEF TOBACCO CESSATION INTERVENTION FORM) \par Date of Yearly Reassessment : [ ]\par Comments:  [ ]\par \par

## 2023-05-04 NOTE — SURGICAL HISTORY
[FreeTextEntry1] : The patient reports that he has had bunion surgery between [id number]. Patient reports having chronic foot pain.

## 2023-05-04 NOTE — RISK ASSESSMENT
[No, patient denies ideation or behavior] : No, patient denies ideation or behavior [Clinical Interview] : Clinical Interview [No] : No [Mood disorder] : mood disorder [PTSD] : PTSD [Identifies reasons for living] : identifies reasons for living [Supportive social network of family or friends] : supportive social network of family or friends [Uatsdin beliefs] : Spiritism beliefs [Cultural, spiritual and/or moral attitudes against suicide] : cultural, spiritual and/or moral attitudes against suicide [Positive therapeutic relationships] : positive therapeutic relationships [None in the patient's lifetime] : None in the patient's lifetime [None Known] : none known [No known risk factors] : No known risk factors [Residential stability] : residential stability [Employment stability] : employment stability [Sobriety] : sobriety [Yes] : yes [de-identified] : the patient reports that he owns firearms that is stored away safely

## 2023-05-04 NOTE — REASON FOR VISIT
[OK  to leave message] : OK  to leave message [FreeTextEntry5] : Cantonese  [FreeTextEntry6] : Maxime  [FreeTextEntry7] : he/him/his [Community Based Organization] : Community Based Organization [[facility] Provider/Facility] : [facility] Provider/Facility [Patient] : Patient [Collateral - Name/Contact Info/Relationship:___] : Collateral: [unfilled] [FreeTextEntry2] : evaluation for anxiety,depression and PTSD related to 9/11 exposure  [FreeTextEntry1] : "Since I was certified I have PTSD. I have nightmares sometimes. I have more anxiety" \par

## 2023-05-04 NOTE — HISTORY OF PRESENT ILLNESS
[FreeTextEntry1] : Solo/tele F/U visit .\par Pt remains very stressed at work.\par He spoke about his job and his problems (has a new supervisor, who is very rude to the pt, yells at him). Supportive and skill therapy were discussed with the pt. \par Pt reports being c/w meds and denies having side effects. (refused to answer if he has any sexual side effects. Feels uncomfortable discussing this mater).\par C/o decreased appetite. Nightmares are much better and pt doesn't need prazosin).\par Reports having anxiety when he drives and endorses to having agoraphobia.\par Denies SI/HI//AH/PI. Denies drugs and ETOH.\par Pt doesn't want to increase or change his meds. [FreeTextEntry2] : see above [FreeTextEntry3] : none

## 2023-05-04 NOTE — DISCUSSION/SUMMARY
[FreeTextEntry1] : Solo/tele F/U visit .\par Pt remains very stressed at work.\par He spoke about his job and his problems (has a new supervisor, who is very rude to the pt, yells at him). Supportive and skill therapy were discussed with the pt. \par Pt reports being c/w meds and denies having side effects. (refused to answer if he has any sexual side effects. Feels uncomfortable discussing this mater).\par C/o decreased appetite. Nightmares are much better and pt doesn't need prazosin).\par Reports having anxiety when he drives and endorses to having agoraphobia.\par Denies SI/HI//AH/PI. 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 Pt denies any mental health conditions prior to 9/11), no prior IPP, no SA, never on meds, c/o feeling depressed, having anhedonia, apathy, agoraphobia, flashbacks, hypervigilance, etc. Pt was Dxd with PTSD by 9/11 [facility] and referred to us for treatment. Denies MELINA.\par  Pt wants to be started on meds. Risks and benefits of prozac were d/w \par 1. Next appt in 1m tele\par 2. Doesn't want to increase prozac\par 5. How is his new supervisor (he was screaming at the pt). Is his job less stressful [Low acute suicide risk] : Low acute suicide risk [No] : No [Not clinically indicated] : Safety Plan completed/updated (for individuals at risk): Not clinically indicated

## 2023-05-04 NOTE — PHYSICAL EXAM
[None] : none [Average] : average [Intermittent] : intermittent [Slowed] : slowed [Cooperative] : cooperative [Depressed] : depressed [Anxious] : anxious [Constricted] : constricted [Clear] : clear [Linear/Goal Directed] : linear/goal directed [WNL] : within normal limits [None Reported] : none reported [Mild] : mild [Moderate] : moderate [de-identified] : poor

## 2023-05-18 ENCOUNTER — APPOINTMENT (OUTPATIENT)
Dept: PSYCHIATRY | Facility: CLINIC | Age: 48
End: 2023-05-18

## 2023-05-18 ENCOUNTER — OUTPATIENT (OUTPATIENT)
Dept: OUTPATIENT SERVICES | Facility: HOSPITAL | Age: 48
LOS: 1 days | End: 2023-05-18
Payer: COMMERCIAL

## 2023-05-18 DIAGNOSIS — F43.10 POST-TRAUMATIC STRESS DISORDER, UNSPECIFIED: ICD-10-CM

## 2023-05-18 PROCEDURE — 90832 PSYTX W PT 30 MINUTES: CPT | Mod: 95

## 2023-05-18 NOTE — PLAN
[FreeTextEntry2] : Goal #1- Management of depression \par Obj #1- The patient will identify three triggers for his depression and will verbalize and utilize three effective coping mechanisms \par Obj #2-  The patient will attend medication management appointments and will take his psychotropic medications as prescribed \par \par Goal #2- Management of anxiety \par Obj #2-  The patient will verbalize three triggers for anxiety and will verbalize and utilize at least three effective coping mechanisms for anxiety [Cognitive and/or Behavior Therapy] : Cognitive and/or Behavior Therapy  [Skills training (all types)] : Skills training (all types)  [Supportive Therapy] : Supportive Therapy [de-identified] : The patient attended his scheduled session with the writer via [employer]. The patient noted that he has been feeling ill and has been diagnosed with a cyst in his stomach along with gallbladder stones. The patient explained that there is a potential that he will be needing surgery to resolve the situation. The writer explored feelings associated with this. The patient noted that he is eager to have surgery as he has been in pain. The writer actively listened to the patient, validated his feelings and support was rendered. Patient noted that he has not been at work for the last week to ongoing medical issues. Patient notes that he has not been truly focusing on his anxiety and depression and things remain the same in this regard. Patient notes that on the days that he returned to work the stressors have been lessened as he was working the weekend when he was not under the pressure of his supervisor. Patient and writer discussed potentially getting his shift switched as he noted less stressors. Patient reports medication adherence. The writer will continue to provide the patient with support and encouragement where needed. The patient is scheduled for a follow up visit on 6/1/2023 at 11am.  [Recommended Frequency of Visits: ____] : Recommended frequency of visits: [unfilled] [Return in ____ week(s)] : Return in [unfilled] week(s)

## 2023-05-18 NOTE — REASON FOR VISIT
[Patient preference] : as per patient preference [Continuing, patient seen in-person within last 12 months] : Telehealth services are continuing as patient has been seen in-person within last 12 months. [Telehealth (audio & video) - Individual/Group] : This visit was provided via telehealth using real-time 2-way audio visual technology. [Other Location: e.g. Home (Enter Location, City,State)___] : The provider was located at [unfilled]. [Home] : The patient, [unfilled], was located at home, [unfilled], at the time of the visit. [Verbal consent obtained from patient/other participant(s)] : Verbal consent for telehealth/telephonic services obtained from patient/other participant(s) [FreeTextEntry4] : 11am  [FreeTextEntry5] : 11:16am  [FreeTextEntry3] : patient requested telehealth sessions and is appropriate for telehealth services  [Patient] : Patient [FreeTextEntry1] : psychotherapy follow up

## 2023-06-01 ENCOUNTER — APPOINTMENT (OUTPATIENT)
Dept: PSYCHIATRY | Facility: CLINIC | Age: 48
End: 2023-06-01

## 2023-06-01 ENCOUNTER — OUTPATIENT (OUTPATIENT)
Dept: OUTPATIENT SERVICES | Facility: HOSPITAL | Age: 48
LOS: 1 days | End: 2023-06-01
Payer: COMMERCIAL

## 2023-06-01 DIAGNOSIS — F43.10 POST-TRAUMATIC STRESS DISORDER, UNSPECIFIED: ICD-10-CM

## 2023-06-01 PROCEDURE — 90833 PSYTX W PT W E/M 30 MIN: CPT | Mod: 95

## 2023-06-01 PROCEDURE — 90832 PSYTX W PT 30 MINUTES: CPT | Mod: 95

## 2023-06-01 NOTE — PLAN
[Cognitive and/or Behavior Therapy] : Cognitive and/or Behavior Therapy  [Skills training (all types)] : Skills training (all types)  [Supportive Therapy] : Supportive Therapy [FreeTextEntry2] : Goal #1- Management of depression \par Obj #1- The patient will identify three triggers for his depression and will verbalize and utilize three effective coping mechanisms \par Obj #2-  The patient will attend medication management appointments and will take his psychotropic medications as prescribed \par \par Goal #2- Management of anxiety \par Obj #2-  The patient will verbalize three triggers for anxiety and will verbalize and utilize at least three effective coping mechanisms for anxiety [de-identified] : The patient attended his scheduled session with the writer via [employer]. The patient noted that he has been feeling "worse" since his last session. The writer explored these feelings with the patient and he  noted that he continues to feel ill as he had the flu along with the body aches that comes with the illness. Patient noted that he has been feeling anxious and depressed. The patient noted that he has used his allotted vacation and sick time. Patient verbalized concerns that if he continues to feel sick and or need to miss work due to potentially needing surgery, he will lose his health coverage as a result. The writer actively listened to the patient, validated his feelings and support was rendered. 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 The patient reports medication adherence. However, he has not noticed much change regarding his mood. Patient is reluctant to increase medication due to fears of him testing positive on a drug screening test. Writer encouraged the patient to voice these concerns to his psychiatrist during his next scheduled appointment. The writer will continue to provide the patient with support and encouragement where needed. The patient is scheduled for a follow up visit on 6/22/2023 at 11am. [Recommended Frequency of Visits: ____] : Recommended frequency of visits: [unfilled] [Return in ____ week(s)] : Return in [unfilled] week(s)

## 2023-06-01 NOTE — REASON FOR VISIT
[Patient preference] : as per patient preference [Continuing, patient seen in-person within last 12 months] : Telehealth services are continuing as patient has been seen in-person within last 12 months. [Telehealth (audio & video) - Individual/Group] : This visit was provided via telehealth using real-time 2-way audio visual technology. [Other Location: e.g. Home (Enter Location, City,State)___] : The provider was located at [unfilled]. [Home] : The patient, [unfilled], was located at home, [unfilled], at the time of the visit. [Verbal consent obtained from patient/other participant(s)] : Verbal consent for telehealth/telephonic services obtained from patient/other participant(s) [Patient] : Patient [FreeTextEntry4] : 11am [FreeTextEntry5] : 11:16am  [FreeTextEntry3] : patient requested telehealth sessions and is appropriate for telehealth services  [FreeTextEntry1] : psychotherapy follow up

## 2023-06-22 ENCOUNTER — OUTPATIENT (OUTPATIENT)
Dept: OUTPATIENT SERVICES | Facility: HOSPITAL | Age: 48
LOS: 1 days | End: 2023-06-22
Payer: COMMERCIAL

## 2023-06-22 ENCOUNTER — APPOINTMENT (OUTPATIENT)
Dept: PSYCHIATRY | Facility: CLINIC | Age: 48
End: 2023-06-22

## 2023-06-22 DIAGNOSIS — F43.10 POST-TRAUMATIC STRESS DISORDER, UNSPECIFIED: ICD-10-CM

## 2023-06-22 PROCEDURE — 90832 PSYTX W PT 30 MINUTES: CPT | Mod: 95

## 2023-06-22 NOTE — PLAN
[Cognitive and/or Behavior Therapy] : Cognitive and/or Behavior Therapy  [Skills training (all types)] : Skills training (all types)  [Supportive Therapy] : Supportive Therapy [FreeTextEntry2] : Goal #1- Management of depression \par Obj #1- The patient will identify three triggers for his depression and will verbalize and utilize three effective coping mechanisms \par Obj #2-  The patient will attend medication management appointments and will take his psychotropic medications as prescribed \par \par Goal #2- Management of anxiety \par Obj #2-  The patient will verbalize three triggers for anxiety and will verbalize and utilize at least three effective coping mechanisms for anxiety [de-identified] : The patient attended his scheduled session with the writer via [employer]. The patient noted that there has not been much changes regarding his anxiety and depression. The patient noted that he has been feeling munch better however, he notes feelings of apprehension to eat given his GI issues. The patient notes that he has lost approximately 20 pounds as he often does not have an appetite. The patient noted that he is currently awaiting a referral for a GI doctor. Being an advocate for himself was discussed with the patient as he is losing weight and is in need of specialty care. \par Patient noted feelings of disappointment as he has had to use all of his vacation time when he was ill. Patient explained that this has hindered his ability to take vacation time. The writer actively listened to the patient, validated his feelings and support was rendered. Patient remained optimistic and is hopeful that he will regain his time back by the end of the year. Patient notes that he has been taking lunch breaks more often. The writer commended the patient. The patient reports medication adherence. The writer will continue to provide the patient with support and encouragement where needed. The patient is scheduled for a follow up visit on 7/6/2023 at 11am.  [Recommended Frequency of Visits: ____] : Recommended frequency of visits: [unfilled] [Return in ____ week(s)] : Return in [unfilled] week(s)

## 2023-06-22 NOTE — REASON FOR VISIT
[Starting, patient seen in-person within last 6 months] : Telehealth services are being started as patient has seen in-person within last 6 months. [Telehealth (audio & video) - Individual/Group] : This visit was provided via telehealth using real-time 2-way audio visual technology. [Other Location: e.g. Home (Enter Location, City,State)___] : The provider was located at [unfilled]. [Home] : The patient, [unfilled], was located at home, [unfilled], at the time of the visit. [Verbal consent obtained from patient/other participant(s)] : Verbal consent for telehealth/telephonic services obtained from patient/other participant(s) [Patient] : Patient [FreeTextEntry4] : 11:02am  [FreeTextEntry5] : 11:18am  [FreeTextEntry2] : 2/24/2023 [FreeTextEntry3] : patient requested telehealth sessions and is appropriate for telehealth services  [FreeTextEntry1] : psychotherapy follow up

## 2023-06-23 DIAGNOSIS — F43.10 POST-TRAUMATIC STRESS DISORDER, UNSPECIFIED: ICD-10-CM

## 2023-07-03 NOTE — END OF VISIT
[facility] Care Transitions Initial Follow Up Call    Call within 2 business days of discharge: Yes    Patient Current Location:  [facility] Transition Nurse contacted the patient by telephone to perform post hospital discharge assessment. Verified name and  with patient as identifiers. Provided introduction to self, and explanation of the Care Transition Nurse role. Patient: Eliza Acotsa Patient : 1980   MRN: [id number]  Reason for Admission: morbid obesity   Discharge Date: 23 RARS: Readmission Risk Score: 2.9      Last Discharge [address] [facility]       Date Complaint Diagnosis Description Type Department Provider    23  Postoperative pain Admission (Discharged) Tracie Lopez MD            Was this an external facility discharge? No Discharge Facility: [facility]     Challenges to be reviewed by the provider   Additional needs identified to be addressed with provider: No  none               Method of communication with provider: none. Able to reach pt. Discussed IP stay and procedure. 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 Discussed post-op care. Pt filled pain medications. Discussed diet and activity. Pt still on liquid diet and following as directed. Discussed ERAS protocol in hospital and importance of mobility at home. Discussed red flags and pt reports understanding. Pt is off some of her medications and will resume after post-op follow up. Pt plans to get mirlax today to help keep her BM regular. No reports no nausea or concerns at this time. Pt has post op follow up 2023. Pt is agreeable to [facility] follow up. Care Transition Nurse reviewed discharge instructions and red flags with patient who verbalized understanding. The patient was given an opportunity to ask questions and does not have any further questions or concerns at this time.  Were discharge instructions available to [Duration of Psychotherapy Visit (minutes spent in synchronous communication): ____] : Duration of Psychotherapy Visit (minutes spent in synchronous communication): [unfilled] [Individual Psychotherapy for 16-37 minutes] : Individual Psychotherapy for 16-37 minutes

## 2023-07-06 ENCOUNTER — APPOINTMENT (OUTPATIENT)
Dept: PSYCHIATRY | Facility: CLINIC | Age: 48
End: 2023-07-06

## 2023-07-13 ENCOUNTER — APPOINTMENT (OUTPATIENT)
Dept: PSYCHIATRY | Facility: CLINIC | Age: 48
End: 2023-07-13
Payer: COMMERCIAL

## 2023-07-13 ENCOUNTER — OUTPATIENT (OUTPATIENT)
Dept: OUTPATIENT SERVICES | Facility: HOSPITAL | Age: 48
LOS: 1 days | End: 2023-07-13
Payer: COMMERCIAL

## 2023-07-13 DIAGNOSIS — F43.10 POST-TRAUMATIC STRESS DISORDER, UNSPECIFIED: ICD-10-CM

## 2023-07-13 DIAGNOSIS — F60.9 PERSONALITY DISORDER, UNSPECIFIED: ICD-10-CM

## 2023-07-13 PROCEDURE — 99214 OFFICE O/P EST MOD 30 MIN: CPT | Mod: 95

## 2023-07-13 NOTE — SURGICAL HISTORY
[FreeTextEntry1] : The patient reports that he has had bunion surgery between [id number]. Patient reports having chronic foot pain.

## 2023-07-13 NOTE — RISK ASSESSMENT
[No, patient denies ideation or behavior] : No, patient denies ideation or behavior [Clinical Interview] : Clinical Interview [No] : No [Mood disorder] : mood disorder [PTSD] : PTSD [Identifies reasons for living] : identifies reasons for living [Supportive social network of family or friends] : supportive social network of family or friends [Samaritan beliefs] : Yazidism beliefs [Cultural, spiritual and/or moral attitudes against suicide] : cultural, spiritual and/or moral attitudes against suicide [Positive therapeutic relationships] : positive therapeutic relationships [None in the patient's lifetime] : None in the patient's lifetime [None Known] : none known [No known risk factors] : No known risk factors [Residential stability] : residential stability [Employment stability] : employment stability [Sobriety] : sobriety [Yes] : yes [de-identified] : the patient reports that he owns firearms that is stored away safely

## 2023-07-13 NOTE — FAMILY HISTORY
[FreeTextEntry1] : Family composition: wife, mom, aunt and one cousin in [place] only good relationship with mom. Father passed away in 2008. \par Family history and background: Patient identifies as Chinese American\par Family relationship: Patient reports that he has a good relationship with is family\par Pertinent Family Medical, MH and Substance Use History including Adult Child of Alcoholic and child of substance abuse status; history of cancer and heart disease: Patient reports having hypertension, no history of substance use, the patient denied family history of substance use, breast cancer on mothers side and thyroid cancer.\par

## 2023-07-13 NOTE — PHYSICAL EXAM
[None] : none [Average] : average [Intermittent] : intermittent [Slowed] : slowed [Cooperative] : cooperative [Depressed] : depressed [Anxious] : anxious [Constricted] : constricted [Clear] : clear [Linear/Goal Directed] : linear/goal directed [WNL] : within normal limits [None Reported] : none reported [Mild] : mild [Moderate] : moderate [de-identified] : poor

## 2023-07-13 NOTE — DISCUSSION/SUMMARY
[FreeTextEntry1] : Solo/tele F/U visit . \par Pt is a poor historian and needs to be prodded. He says that he is doing OK, but later said that that he  continues to have problems with his supervisor and takes days off when he has to work with him.\par Pt remains very stressed at work.\par He spoke about his job and his problems. Supportive and skill therapy were discussed with the pt. \par Pt reports being c/w meds and denies having side effects. (First, refused to answer if he has any sexual side effects, but when was pressed, said that this is a reason why he doesn't want to increase prozac). Other options were discussed but pt declined any changes of meds.\par C/o decreased appetite. Nightmares are much better and pt doesn't need prazosin).\par Reports having anxiety when he drives and endorses to having agoraphobia, but learned to tolerate it.\par Denies SI/HI//AH/PI. 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 Pt denies any mental health conditions prior to 9/11), no prior IPP, no SA, never on meds, c/o feeling depressed, having anhedonia, apathy, agoraphobia, flashbacks, hypervigilance, etc. Pt was Dxd with PTSD by 9/11 fund and referred to us for treatment. Denies MELINA.\par  Pt wants to be started on meds. Risks and benefits of prozac were d/w \par 1. Next appt in 1m tele\par 2. Doesn't want to increase prozac or to add wellbutrin or other meds\par 5. How is his new supervisor (he was screaming at the pt). Is his job less stressful. Did he stop taking days off when the bad supervisor is on duty with him.

## 2023-07-13 NOTE — HISTORY OF PRESENT ILLNESS
[FreeTextEntry1] : Solo/tele F/U visit . \par Pt is a poor historian and needs to be prodded. He says that he is doing OK, but later said that that he  continues to have problems with his supervisor and takes days off when he has to work with him.\par Pt remains very stressed at work.\par He spoke about his job and his problems. Supportive and skill therapy were discussed with the pt. \par Pt reports being c/w meds and denies having side effects. (First, refused to answer if he has any sexual side effects, but when was pressed, said that this is a reason why he doesn't want to increase prozac). Other options were discussed but pt declined any changes of meds.\par C/o decreased appetite. Nightmares are much better and pt doesn't need prazosin).\par Reports having anxiety when he drives and endorses to having agoraphobia, but learned to tolerate it.\par Denies SI/HI//AH/PI. Denies drugs and ETOH.\par Pt doesn't want to increase or change his meds. [FreeTextEntry2] : see above [FreeTextEntry3] : none

## 2023-07-13 NOTE — SOCIAL HISTORY
[FreeTextEntry1] : Legal Status \par Does individual Served have a Legal Guardian, rep Payee or Conservatorship?\par [ X] No\par [ ] Yes - Details: [ ] \par \par Legal Involvement history \par Does the individual have a history of or current involvement with the legal system? \par [ X] No\par [ ] Yes - Details: [ ] \par \par  Services \par Have you ever served in the ? \par [X ] No\par [ ] Yes - Details: [ ] \par \par Employment history: Patient is currently a . 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 Patient reports that he is a naturalized citizen. \par \par \par Social supports (friends, Volunteers, club, AA meeting, other meetings ) ? Patient reports that his wife, Joyce is his only social support. \par \par Meaningful Activities: video games\par \par \par Spiritual Assessment Tool - FICA\par F. What is your carmen or belief? Scientology\par Do you consider yourself spiritual or Mandaeism? No \par Is there something you believe in that gives meaning to your life? Joyce my wife \par I: Is it important in your life? Yes \par What influence does it have on how you take care of yourself? "I want to be a better person overall" \par How have your beliefs influenced your behavior during this illness? What role do your beliefs play in regaining your health?  "I follow the teachings of Buddha" \par C. Are you part of a spiritual or Mandaeism community? No \par Is this of support to you and how? N/A \par Is there a person or group of people you really love or who are really important to you? Joyce , mom and aunt \par H. We have been discussing your belief and supports. What else gives you internal support? No\par What are your sources of hope, strength, comfort and peace? "being home in my safe place" \par What do you hold on to during difficult times? No \par what sustains you and keeps you going? My wife \par A. How would you like me, your healthcare provider, to address these issues in your healthcare? Talk about it \par \par SMOKING CESSATION \par Do you Smoke?                              [ X] Yes		[ ] No\par Do you want to quit? 		[ ] Yes		[X ] No\par -ASK\par ·	Number of cigarettes   [ ] cigars [ ]  pipe bowls [20 ]  per day \par ·	Number of ST cans/ pouches per week [ 7]\par ·	Number of years used [ over 30 years ]\par ·	How soon after you wake up do you use tobacco?  [X ] within 30 minutes      [ ] more than 30 minutes \par ·	Previous quit attempts:  # of attempts [ ] longest quit period [ ] methods(s) used [ ]- Never tried to quit smoking\par How long ago was last attempt to quit  [ ] years [ ] months- NA \par ·	Reasons for wanting to quit[ ] Patient reports that he does not want to quit smoking \par -ADVISE   about the oral benefits of quitting\par -ASSESS   willingness to make a quit attempt (Stage of Change) \par 	    [ ] Precontemplation (Stop here & Reassess next visit)	[ ] Contemplation [ ] Preparation  \par -ASSIST    (depending on stage of change) \par ·	Self-Help Pamphlets & Materials\par ·	List of local community group/individual quit programs and phone help lines\par ·	Encourage a quit date (for those who are ready)\par ·	Pharmacotherapy: Nicotine gum/ Lozenge/ Patch/ Inhaler/NS/Zyban/ Chantix\par  	RX: 	[ ]  () \par -ARRANGE  Follow up if set a quit date (with permission)\par Quit Date: [ ]  Phone calls or visits:  [ ] Week 1-2  Months   [ ] 1   [ ] 3   [ ] 6   [ ] 12  \par -Yearly Reassessment    [ ] No Changes of Smoking [ ] Change of Smoking Habit (Non-Smoker to Smoker/ Smoker to Non Smoker)\par (If there is change from Non Smoker to Smoker, please fill out new BRIEF TOBACCO CESSATION INTERVENTION FORM) \par Date of Yearly Reassessment : [ ]\par Comments:  [ ]\par \par

## 2023-07-13 NOTE — REASON FOR VISIT
[OK  to leave message] : OK  to leave message [FreeTextEntry5] : Cantonese  [FreeTextEntry6] : Maxime  [FreeTextEntry7] : he/him/his [Community Based Organization] : Community Based Organization [Non-[facility] Provider/Facility] : Non-[facility] Provider/Facility [Patient] : Patient [Collateral - Name/Contact Info/Relationship:___] : Collateral: [unfilled] [FreeTextEntry2] : evaluation for anxiety,depression and PTSD related to 9/11 exposure  [FreeTextEntry1] : "Since I was certified I have PTSD. I have nightmares sometimes. I have more anxiety" \par

## 2023-07-14 DIAGNOSIS — F43.10 POST-TRAUMATIC STRESS DISORDER, UNSPECIFIED: ICD-10-CM

## 2023-07-20 ENCOUNTER — OUTPATIENT (OUTPATIENT)
Dept: OUTPATIENT SERVICES | Facility: HOSPITAL | Age: 48
LOS: 1 days | End: 2023-07-20
Payer: COMMERCIAL

## 2023-07-20 ENCOUNTER — APPOINTMENT (OUTPATIENT)
Dept: PSYCHIATRY | Facility: CLINIC | Age: 48
End: 2023-07-20

## 2023-07-20 DIAGNOSIS — F43.10 POST-TRAUMATIC STRESS DISORDER, UNSPECIFIED: ICD-10-CM

## 2023-07-20 PROCEDURE — 90832 PSYTX W PT 30 MINUTES: CPT | Mod: 95

## 2023-07-20 NOTE — PLAN
[Cognitive and/or Behavior Therapy] : Cognitive and/or Behavior Therapy  [Skills training (all types)] : Skills training (all types)  [Supportive Therapy] : Supportive Therapy [FreeTextEntry2] : Goal #1- Management of depression \par Obj #1- The patient will identify three triggers for his depression and will verbalize and utilize three effective coping mechanisms \par Obj #2-  The patient will attend medication management appointments and will take his psychotropic medications as prescribed \par \par Goal #2- Management of anxiety \par Obj #2-  The patient will verbalize three triggers for anxiety and will verbalize and utilize at least three effective coping mechanisms for anxiety [de-identified] : The patient attended his scheduled session with the writer via [email or web address] The patient noted that his mood and anxiety levels remain the same. Writer explored this further with the patient and he explained that he has ongoing working stressors that is contributing to the way that he has been feeling. The patient verbalized ongoing demands at work and feelings of being overwhelmed. The writer and the patient discussed the use of self care. Patient was able to verbalize the need to take appropriate breaks throughout the day and adhering to taking a lunch break. \par Writer followed up with the  patient regarding the status of seeing a GI doctor to address his gastrointestinal issues. Patient noted that he has not been able to get  in touch with the recommended GI surgeon Writer strongly encouraged the patient to contact the surgeon to get his name on the list for a next scheduled appointment despite the long wait time. The patient reports medication adherence. The patient reports medication adherence. The writer is scheduled for a follow up visit on 8/3/2023 at 11am.  [Recommended Frequency of Visits: ____] : Recommended frequency of visits: [unfilled] [Return in ____ week(s)] : Return in [unfilled] week(s)

## 2023-07-20 NOTE — REASON FOR VISIT
[Patient preference] : as per patient preference [Starting, patient seen in-person within last 6 months] : Telehealth services are being started as patient has seen in-person within last 6 months. [Telehealth (audio & video) - Individual/Group] : This visit was provided via telehealth using real-time 2-way audio visual technology. [Other Location: e.g. Home (Enter Location, City,State)___] : The provider was located at [unfilled]. [Home] : The patient, [unfilled], was located at home, [unfilled], at the time of the visit. [Verbal consent obtained from patient/other participant(s)] : Verbal consent for telehealth/telephonic services obtained from patient/other participant(s) [Patient] : Patient [FreeTextEntry4] : 11:13am  [FreeTextEntry5] : 11:33am  [FreeTextEntry2] : 2/24/2023 [FreeTextEntry3] : patient requested telehealth sessions and is appropriate for telehealth services  [FreeTextEntry1] : psychotherapy follow up

## 2023-07-21 DIAGNOSIS — F43.10 POST-TRAUMATIC STRESS DISORDER, UNSPECIFIED: ICD-10-CM

## 2023-08-03 ENCOUNTER — OUTPATIENT (OUTPATIENT)
Dept: OUTPATIENT SERVICES | Facility: HOSPITAL | Age: 48
LOS: 1 days | End: 2023-08-03
Payer: COMMERCIAL

## 2023-08-03 ENCOUNTER — APPOINTMENT (OUTPATIENT)
Dept: PSYCHIATRY | Facility: CLINIC | Age: 48
End: 2023-08-03

## 2023-08-03 DIAGNOSIS — F43.10 POST-TRAUMATIC STRESS DISORDER, UNSPECIFIED: ICD-10-CM

## 2023-08-03 PROCEDURE — 90832 PSYTX W PT 30 MINUTES: CPT | Mod: 95

## 2023-08-03 NOTE — END OF VISIT
[Duration of Psychotherapy Visit (minutes spent in synchronous communication): ____] : Duration of Psychotherapy Visit (minutes spent in synchronous communication): [unfilled] [Licensed Clinician] : Licensed Clinician

## 2023-08-04 DIAGNOSIS — F43.10 POST-TRAUMATIC STRESS DISORDER, UNSPECIFIED: ICD-10-CM

## 2023-08-08 NOTE — PLAN
[Cognitive and/or Behavior Therapy] : Cognitive and/or Behavior Therapy  [Skills training (all types)] : Skills training (all types)  [Supportive Therapy] : Supportive Therapy [Recommended Frequency of Visits: ____] : Recommended frequency of visits: [unfilled] [Return in ____ week(s)] : Return in [unfilled] week(s) [FreeTextEntry2] : Goal #1- Management of depression  Obj #1- The patient will identify three triggers for his depression and will verbalize and utilize three effective coping mechanisms  Obj #2-  The patient will attend medication management appointments and will take his psychotropic medications as prescribed   Goal #2- Management of anxiety  Obj #2-  The patient will verbalize three triggers for anxiety and will verbalize and utilize at least three effective coping mechanisms for anxiety [de-identified] : The patient attended his scheduled session with the writer via [employer]. The patient noted that his mood and anxiety remain the same. The patient noted that he would rank his mental health overall over the course of the last two weeks as a 7. The patient noted ongoing gastro intestinal issues that are "off and on". Patient has an upcoming appointment with a gastro surgeon during the 2nd week of September (patient could not recall the exact day and time). Patient notes ongoing work related stressors. Patient explained that despite getting angry at work, he noted the use of ongoing coping mechanisms in the form of walking away. The writer commended the patient. The patient notes that he is looking forward to a family vacation next month. Patient notes that he continues to have a lack of motivation and is oversleeping. Patient and writer discussed patient's goals and he noted that to achieve his goals he would need to increase his behavioral activation. Writer commended the patient for his insight. Patient notes medication adherence. The writer will continue to provide the patient with support and encouragement where needed. The patient is scheduled for a follow up visit on 8/17/2023 at 11am.

## 2023-08-08 NOTE — REASON FOR VISIT
[Patient preference] : as per patient preference [Continuing, patient seen in-person within last 12 months] : Telehealth services are continuing as patient has been seen in-person within last 12 months. [Telehealth (audio & video) - Individual/Group] : This visit was provided via telehealth using real-time 2-way audio visual technology. [Other Location: e.g. Home (Enter Location, City,State)___] : The provider was located at [unfilled]. [Home] : The patient, [unfilled], was located at home, [unfilled], at the time of the visit. [Verbal consent obtained from patient/other participant(s)] : Verbal consent for telehealth/telephonic services obtained from patient/other participant(s) [Patient] : Patient [FreeTextEntry4] : 11am  [FreeTextEntry5] : 11:17am  [FreeTextEntry2] : 2/24/2023 [FreeTextEntry3] : patient requested telehealth sessions and is appropriate for telehealth services  [FreeTextEntry1] : psychotherapy follow up

## 2023-08-10 ENCOUNTER — APPOINTMENT (OUTPATIENT)
Dept: PSYCHIATRY | Facility: CLINIC | Age: 48
End: 2023-08-10
Payer: COMMERCIAL

## 2023-08-10 ENCOUNTER — OUTPATIENT (OUTPATIENT)
Dept: OUTPATIENT SERVICES | Facility: HOSPITAL | Age: 48
LOS: 1 days | End: 2023-08-10
Payer: COMMERCIAL

## 2023-08-10 DIAGNOSIS — F33.1 MAJOR DEPRESSIVE DISORDER, RECURRENT, MODERATE: ICD-10-CM

## 2023-08-10 DIAGNOSIS — F41.0 GENERALIZED ANXIETY DISORDER: ICD-10-CM

## 2023-08-10 DIAGNOSIS — F43.10 POST-TRAUMATIC STRESS DISORDER, UNSPECIFIED: ICD-10-CM

## 2023-08-10 DIAGNOSIS — F41.1 GENERALIZED ANXIETY DISORDER: ICD-10-CM

## 2023-08-10 DIAGNOSIS — F41.9 MAJOR DEPRESSIVE DISORDER, RECURRENT, MODERATE: ICD-10-CM

## 2023-08-10 DIAGNOSIS — F40.01 AGORAPHOBIA WITH PANIC DISORDER: ICD-10-CM

## 2023-08-10 PROCEDURE — 99214 OFFICE O/P EST MOD 30 MIN: CPT | Mod: 95

## 2023-08-10 NOTE — REASON FOR VISIT
[OK  to leave message] : OK  to leave message [FreeTextEntry5] : Cantonese  [FreeTextEntry6] : Maxime  [FreeTextEntry7] : he/him/his [Community Based Organization] : Community Based Organization [Non-[facility] Provider/Facility] : Non-[facility] Provider/Facility [Patient] : Patient [Collateral - Name/Contact Info/Relationship:___] : Collateral: [unfilled] [FreeTextEntry2] : evaluation for anxiety,depression and PTSD related to 9/11 exposure  [FreeTextEntry1] : "Since I was certified I have PTSD. I have nightmares sometimes. I have more anxiety" \par

## 2023-08-10 NOTE — FAMILY HISTORY
[FreeTextEntry1] : Family composition: wife, mom, aunt and one cousin in [place] only good relationship with mom. Father passed away in 2008. \par  Family history and background: Patient identifies as Chinese American\par  Family relationship: Patient reports that he has a good relationship with is family\par  Pertinent Family Medical, MH and Substance Use History including Adult Child of Alcoholic and child of substance abuse status; history of cancer and heart disease: Patient reports having hypertension, no history of substance use, the patient denied family history of substance use, breast cancer on mothers side and thyroid cancer.\par

## 2023-08-10 NOTE — SOCIAL HISTORY
[FreeTextEntry1] : Legal Status \par  Does individual Served have a Legal Guardian, rep Payee or Conservatorship?\par  [ X] No\par  [ ] Yes - Details: [ ] \par  \par  Legal Involvement history \par  Does the individual have a history of or current involvement with the legal system? \par  [ X] No\par  [ ] Yes - Details: [ ] \par  \par   Services \par  Have you ever served in the ? \par  [X ] No\par  [ ] Yes - Details: [ ] \par  \par  Employment history: Patient is currently a . Patient is a member of the [employer] department for over 20 years.\par  \par  Developmental history: N/A \par  \par  Sexual hx/identity Sexual History/ Concern (include sexual orientation and other relevant information): Heterosexual cisgender male \par  \par  Race - ethnicity - culture information:  American born in [place] and [place]. Patient reports that he is a naturalized citizen. \par  \par  \par  Social supports (friends, Volunteers, club, AA meeting, other meetings ) ? Patient reports that his wife, Joyce is his only social support. \par  \par  Meaningful Activities: video games\par  \par  \par  Spiritual Assessment Tool - FICA\par  F. What is your carmen or belief? Roman Catholic\par  Do you consider yourself spiritual or Scientology? No \par  Is there something you believe in that gives meaning to your life? Joyce my wife \par  I: Is it important in your life? Yes \par  What influence does it have on how you take care of yourself? "I want to be a better person overall" \par  How have your beliefs influenced your behavior during this illness? What role do your beliefs play in regaining your health?  "I follow the teachings of Buddha" \par  C. Are you part of a spiritual or Scientology community? No \par  Is this of support to you and how? N/A \par  Is there a person or group of people you really love or who are really important to you? Joyce , mom and aunt \par  H. We have been discussing your belief and supports. What else gives you internal support? No\par  What are your sources of hope, strength, comfort and peace? "being home in my safe place" \par  What do you hold on to during difficult times? No \par  what sustains you and keeps you going? My wife \par  A. How would you like me, your healthcare provider, to address these issues in your healthcare? Talk about it \par  \par  SMOKING CESSATION \par  Do you Smoke?                              [ X] Yes		[ ] No\par  Do you want to quit? 		[ ] Yes		[X ] No\par  -ASK\par  	Number of cigarettes   [ ] cigars [ ]  pipe bowls [20 ]  per day \par  	Number of ST cans/ pouches per week [ 7]\par  	Number of years used [ over 30 years ]\par  	How soon after you wake up do you use tobacco?  [X ] within 30 minutes      [ ] more than 30 minutes \par  	Previous quit attempts:  # of attempts [ ] longest quit period [ ] methods(s) used [ ]- Never tried to quit smoking\par  How long ago was last attempt to quit  [ ] years [ ] months- NA \par  	Reasons for wanting to quit[ ] Patient reports that he does not want to quit smoking \par  -ADVISE   about the oral benefits of quitting\par  -ASSESS   willingness to make a quit attempt (Stage of Change) \par  	    [ ] Precontemplation (Stop here & Reassess next visit)	[ ] Contemplation [ ] Preparation  \par  -ASSIST    (depending on stage of change) \par  	Self-Help Pamphlets & Materials\par  	List of local community group/individual quit programs and phone help lines\par  	Encourage a quit date (for those who are ready)\par  	Pharmacotherapy: Nicotine gum/ Lozenge/ Patch/ Inhaler/NS/Zyban/ Chantix\par   	RX: 	[ ]  () \par  -ARRANGE  Follow up if set a quit date (with permission)\par  Quit Date: [ ]  Phone calls or visits:  [ ] Week 1-2  Months   [ ] 1   [ ] 3   [ ] 6   [ ] 12  \par  -Yearly Reassessment    [ ] No Changes of Smoking [ ] Change of Smoking Habit (Non-Smoker to Smoker/ Smoker to Non Smoker)\par  (If there is change from Non Smoker to Smoker, please fill out new BRIEF TOBACCO CESSATION INTERVENTION FORM) \par  Date of Yearly Reassessment : [ ]\par  Comments:  [ ]\par  \par

## 2023-08-10 NOTE — RISK ASSESSMENT
[No, patient denies ideation or behavior] : No, patient denies ideation or behavior [Clinical Interview] : Clinical Interview [No] : No [Mood disorder] : mood disorder [PTSD] : PTSD [Identifies reasons for living] : identifies reasons for living [Supportive social network of family or friends] : supportive social network of family or friends [Rastafari beliefs] : Yarsanism beliefs [Cultural, spiritual and/or moral attitudes against suicide] : cultural, spiritual and/or moral attitudes against suicide [Positive therapeutic relationships] : positive therapeutic relationships [None in the patient's lifetime] : None in the patient's lifetime [None Known] : none known [No known risk factors] : No known risk factors [Residential stability] : residential stability [Employment stability] : employment stability [Sobriety] : sobriety [Yes] : yes [de-identified] : the patient reports that he owns firearms that is stored away safely

## 2023-08-10 NOTE — SURGICAL HISTORY
[FreeTextEntry1] : The patient reports that he has had bunion surgery between [id number]. Patient reports having chronic foot pain.

## 2023-08-10 NOTE — PHYSICAL EXAM
[None] : none [Average] : average [Intermittent] : intermittent [Slowed] : slowed [Cooperative] : cooperative [Depressed] : depressed [Anxious] : anxious [Constricted] : constricted [Clear] : clear [Linear/Goal Directed] : linear/goal directed [WNL] : within normal limits [None Reported] : none reported [Mild] : mild [Moderate] : moderate [FreeTextEntry8] : less depressed [de-identified] : poor
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
## 2023-08-11 DIAGNOSIS — F43.10 POST-TRAUMATIC STRESS DISORDER, UNSPECIFIED: ICD-10-CM

## 2023-08-28 NOTE — REASON FOR VISIT
[Patient preference] : as per patient preference [Continuing, patient seen in-person within last 12 months] : Telehealth services are continuing as patient has been seen in-person within last 12 months. [Telehealth (audio & video) - Individual/Group] : This visit was provided via telehealth using real-time 2-way audio visual technology. [Other Location: e.g. Home (Enter Location, City,State)___] : The provider was located at [unfilled]. [Home] : The patient, [unfilled], was located at home, [unfilled], at the time of the visit. [Verbal consent obtained from patient/other participant(s)] : Verbal consent for telehealth/telephonic services obtained from patient/other participant(s) [Patient] : Patient [FreeTextEntry4] : 11am  [FreeTextEntry5] : 11:25am  [FreeTextEntry3] : patient requested telehealth sessions and is appropriate for telehealth services  [FreeTextEntry1] : psychotherapy follow up

## 2023-08-28 NOTE — PLAN
[Cognitive and/or Behavior Therapy] : Cognitive and/or Behavior Therapy  [Skills training (all types)] : Skills training (all types)  [Supportive Therapy] : Supportive Therapy [Recommended Frequency of Visits: ____] : Recommended frequency of visits: [unfilled] [Return in ____ week(s)] : Return in [unfilled] week(s) [FreeTextEntry2] : Goal #1- Management of depression \par Obj #1- The patient will identify three triggers for his depression and will verbalize and utilize three effective coping mechanisms \par Obj #2-  The patient will attend medication management appointments and will take his psychotropic medications as prescribed \par \par Goal #2- Management of anxiety \par Obj #2-  The patient will verbalize three triggers for anxiety and will verbalize and utilize at least three effective coping mechanisms for anxiety [de-identified] : The patient attended his scheduled session with the writer via [employer]. The patient noted that there has not been any changes to his anxiety and depression. Patient noted that he continues to feel exhausted more than half of the week. The patient noted that he has lost weight and he explained that he has been having less nightmares. Patient explored the anxiety and depression with the patient and he notes an increase in work related stressors. The writer actively listened to the patient, validated his feelings and support was rendered. Patient noted that he continues to not have the opportunity to take a lunch break at work. Writer and patient discussed advocacy in the work place for having steady hours for a lunch break. Self care was discussed with the patient and the importance of this despite working long hours.\par Patient noted that he is medication adherent. The writer will continue to provide the patient with support and encouragement where needed. The patient is scheduled for a follow up visit on 5/18/2023 at 11am.

## 2023-08-31 ENCOUNTER — OUTPATIENT (OUTPATIENT)
Dept: OUTPATIENT SERVICES | Facility: HOSPITAL | Age: 48
LOS: 1 days | End: 2023-08-31
Payer: COMMERCIAL

## 2023-08-31 ENCOUNTER — APPOINTMENT (OUTPATIENT)
Dept: PSYCHIATRY | Facility: CLINIC | Age: 48
End: 2023-08-31

## 2023-08-31 DIAGNOSIS — F43.10 POST-TRAUMATIC STRESS DISORDER, UNSPECIFIED: ICD-10-CM

## 2023-08-31 PROCEDURE — 90832 PSYTX W PT 30 MINUTES: CPT | Mod: 95
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
## 2023-08-31 NOTE — REASON FOR VISIT
[Patient preference] : as per patient preference [Continuing, patient seen in-person within last 12 months] : Telehealth services are continuing as patient has been seen in-person within last 12 months. [Telehealth (audio & video) - Individual/Group] : This visit was provided via telehealth using real-time 2-way audio visual technology. [Other Location: e.g. Home (Enter Location, City,State)___] : The provider was located at [unfilled]. [Home] : The patient, [unfilled], was located at home, [unfilled], at the time of the visit. [Verbal consent obtained from patient/other participant(s)] : Verbal consent for telehealth/telephonic services obtained from patient/other participant(s) [FreeTextEntry4] : 11am  [FreeTextEntry5] : 11:20am  [FreeTextEntry2] : 2/24/2023 [FreeTextEntry3] : the patient requested telehealth sessions and is appropriate for telehealth services  [Patient] : Patient [FreeTextEntry1] : psychotherapy follow up

## 2023-09-01 DIAGNOSIS — F43.10 POST-TRAUMATIC STRESS DISORDER, UNSPECIFIED: ICD-10-CM

## 2023-09-11 ENCOUNTER — APPOINTMENT (OUTPATIENT)
Dept: PSYCHIATRY | Facility: CLINIC | Age: 48
End: 2023-09-11

## 2023-09-11 ENCOUNTER — OUTPATIENT (OUTPATIENT)
Dept: OUTPATIENT SERVICES | Facility: HOSPITAL | Age: 48
LOS: 1 days | End: 2023-09-11
Payer: COMMERCIAL

## 2023-09-11 DIAGNOSIS — F43.10 POST-TRAUMATIC STRESS DISORDER, UNSPECIFIED: ICD-10-CM

## 2023-09-11 PROCEDURE — 90832 PSYTX W PT 30 MINUTES: CPT | Mod: 95

## 2023-09-12 DIAGNOSIS — F43.10 POST-TRAUMATIC STRESS DISORDER, UNSPECIFIED: ICD-10-CM

## 2023-10-05 ENCOUNTER — APPOINTMENT (OUTPATIENT)
Dept: PSYCHIATRY | Facility: CLINIC | Age: 48
End: 2023-10-05

## 2023-11-02 ENCOUNTER — APPOINTMENT (OUTPATIENT)
Dept: PSYCHIATRY | Facility: CLINIC | Age: 48
End: 2023-11-02

## 2023-11-02 ENCOUNTER — OUTPATIENT (OUTPATIENT)
Dept: OUTPATIENT SERVICES | Facility: HOSPITAL | Age: 48
LOS: 1 days | End: 2023-11-02
Payer: COMMERCIAL

## 2023-11-02 DIAGNOSIS — F43.10 POST-TRAUMATIC STRESS DISORDER, UNSPECIFIED: ICD-10-CM

## 2023-11-02 PROCEDURE — 90832 PSYTX W PT 30 MINUTES: CPT | Mod: 95

## 2023-11-03 DIAGNOSIS — F43.10 POST-TRAUMATIC STRESS DISORDER, UNSPECIFIED: ICD-10-CM

## 2023-11-16 ENCOUNTER — APPOINTMENT (OUTPATIENT)
Dept: PSYCHIATRY | Facility: CLINIC | Age: 48
End: 2023-11-16

## 2023-11-16 ENCOUNTER — OUTPATIENT (OUTPATIENT)
Dept: OUTPATIENT SERVICES | Facility: HOSPITAL | Age: 48
LOS: 1 days | End: 2023-11-16
Payer: COMMERCIAL

## 2023-11-16 DIAGNOSIS — F43.10 POST-TRAUMATIC STRESS DISORDER, UNSPECIFIED: ICD-10-CM

## 2023-11-16 PROCEDURE — 90832 PSYTX W PT 30 MINUTES: CPT

## 2023-11-17 DIAGNOSIS — F43.10 POST-TRAUMATIC STRESS DISORDER, UNSPECIFIED: ICD-10-CM

## 2023-11-30 ENCOUNTER — APPOINTMENT (OUTPATIENT)
Dept: PSYCHIATRY | Facility: CLINIC | Age: 48
End: 2023-11-30

## 2023-11-30 ENCOUNTER — OUTPATIENT (OUTPATIENT)
Dept: OUTPATIENT SERVICES | Facility: HOSPITAL | Age: 48
LOS: 1 days | End: 2023-11-30
Payer: COMMERCIAL

## 2023-11-30 DIAGNOSIS — F43.10 POST-TRAUMATIC STRESS DISORDER, UNSPECIFIED: ICD-10-CM

## 2023-11-30 PROCEDURE — 90832 PSYTX W PT 30 MINUTES: CPT

## 2023-12-01 DIAGNOSIS — F43.10 POST-TRAUMATIC STRESS DISORDER, UNSPECIFIED: ICD-10-CM

## 2023-12-15 ENCOUNTER — OUTPATIENT (OUTPATIENT)
Dept: OUTPATIENT SERVICES | Facility: HOSPITAL | Age: 48
LOS: 1 days | End: 2023-12-15
Payer: COMMERCIAL

## 2023-12-15 ENCOUNTER — APPOINTMENT (OUTPATIENT)
Dept: PSYCHIATRY | Facility: CLINIC | Age: 48
End: 2023-12-15

## 2023-12-15 DIAGNOSIS — F43.10 POST-TRAUMATIC STRESS DISORDER, UNSPECIFIED: ICD-10-CM

## 2023-12-15 PROCEDURE — 90832 PSYTX W PT 30 MINUTES: CPT

## 2023-12-15 NOTE — PLAN
[FreeTextEntry2] : Goal #1- Management of depression  Obj #1- The patient will identify three triggers for his depression and will verbalize and utilize three effective coping mechanisms  Obj #2-  The patient will attend medication management appointments and will take his psychotropic medications as prescribed   Goal #2- Management of anxiety  Obj #2-  The patient will verbalize three triggers for anxiety and will verbalize and utilize at least three effective coping mechanisms for anxiety [Cognitive and/or Behavior Therapy] : Cognitive and/or Behavior Therapy  [Skills training (all types)] : Skills training (all types)  [Supportive Therapy] : Supportive Therapy [de-identified] : The patient attended his scheduled session with the writer via [employer]. The patient noted that his wife has told him that she believes that he "is getting worse". The writer inquired about the patient's perception on how he is doing, and he noted that he has been feeling more anxious and depressed. The writer explored this with the patient further and he explained that he has been having difficulty sleeping with an increase in nightmares as he spends a good portion of the week home alone. Patient explained that his wife has not been home as she is tending to ill grandmother overnight. Patient reported that this has caused feelings of loneliness and has further contributed to his self-isolation. The writer actively listened to the patient, validated his feelings and support was rendered. The patient and the writer discussed the patient communicating his feelings to his wife for additional emotional support. Grounding techniques and positive affirmations is encouraged.  Patient and writer discussed potentially adjusting his medication to address his increase in nightmares, insomnia and ongoing anxiety. The patient has an upcoming appointment with his newly assigned psychiatrist Dr. Lawler. The writer will continue to provide the patient with support and encouragement where needed. The patient is scheduled for a follow up appointment on 1/4/2024 at 11am via telehealth per the patient's request.  [Recommended Frequency of Visits: ____] : Recommended frequency of visits: [unfilled] [Return in ____ week(s)] : Return in [unfilled] week(s)

## 2023-12-15 NOTE — REASON FOR VISIT
[Patient preference] : as per patient preference [Continuing, patient not seen in-person within last 12 months (provide details below)] : Telehealth services are continuing, patient not seen in-person within last 12 months.  [Telehealth (audio & video) - Individual/Group] : This visit was provided via telehealth using real-time 2-way audio visual technology. [Medical Office: ([facility])___] : The provider was located at the medical office in [unfilled]. [Home] : The patient, [unfilled], was located at home, [unfilled], at the time of the visit. [Verbal consent obtained from patient/other participant(s)] : Verbal consent for telehealth/telephonic services obtained from patient/other participant(s) [FreeTextEntry4] : 11:32am  [FreeTextEntry5] : 11:51am  [FreeTextEntry3] : the patient requested telehealth sessions and is appropiate for telehealth services  [Patient] : Patient [FreeTextEntry1] : psychotherapy follow up

## 2023-12-16 DIAGNOSIS — F43.10 POST-TRAUMATIC STRESS DISORDER, UNSPECIFIED: ICD-10-CM

## 2023-12-21 ENCOUNTER — OUTPATIENT (OUTPATIENT)
Dept: OUTPATIENT SERVICES | Facility: HOSPITAL | Age: 48
LOS: 1 days | End: 2023-12-21
Payer: COMMERCIAL

## 2023-12-21 ENCOUNTER — APPOINTMENT (OUTPATIENT)
Dept: PSYCHIATRY | Facility: CLINIC | Age: 48
End: 2023-12-21
Payer: COMMERCIAL

## 2023-12-21 DIAGNOSIS — F41.1 GENERALIZED ANXIETY DISORDER: ICD-10-CM

## 2023-12-21 DIAGNOSIS — F40.01 AGORAPHOBIA WITH PANIC DISORDER: ICD-10-CM

## 2023-12-21 DIAGNOSIS — F33.1 MAJOR DEPRESSIVE DISORDER, RECURRENT, MODERATE: ICD-10-CM

## 2023-12-21 DIAGNOSIS — F33.42 MAJOR DEPRESSIVE DISORDER, RECURRENT, IN FULL REMISSION: ICD-10-CM

## 2023-12-21 DIAGNOSIS — F43.10 POST-TRAUMATIC STRESS DISORDER, UNSPECIFIED: ICD-10-CM

## 2023-12-21 PROCEDURE — 99213 OFFICE O/P EST LOW 20 MIN: CPT | Mod: 95

## 2023-12-21 PROCEDURE — 99214 OFFICE O/P EST MOD 30 MIN: CPT | Mod: 95

## 2023-12-21 NOTE — REASON FOR VISIT
[Telehealth (audio & video) - Individual/Group] : This visit was provided via telehealth using real-time 2-way audio visual technology. [Home] : The patient, [unfilled], was located at home, [unfilled], at the time of the visit. [Verbal consent obtained from patient/other participant(s)] : Verbal consent for telehealth/telephonic services obtained from patient/other participant(s) [OK  to leave message] : OK  to leave message [FreeTextEntry5] : Cantonese  [FreeTextEntry6] : Maxime  [FreeTextEntry7] : he/him/his [Community Based Organization] : Community Based Organization [Non-[facility] Health Provider/Facility] : Non-[facility] Health Provider/Facility [Patient] : Patient [Collateral - Name/Contact Info/Relationship:___] : Collateral: [unfilled] [FreeTextEntry2] : evaluation for anxiety,depression and PTSD related to 9/11 exposure  [FreeTextEntry1] : "Since I was certified I have PTSD. I have nightmares sometimes. I have more anxiety" \par

## 2023-12-21 NOTE — SURGICAL HISTORY
[FreeTextEntry1] : The patient reports that he has had bunion surgery between [id number]. Patient reports having chronic foot pain.
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
## 2023-12-21 NOTE — PHYSICAL EXAM
[None] : none [Average] : average [Intermittent] : intermittent [Slowed] : slowed [Cooperative] : cooperative [Depressed] : depressed [Anxious] : anxious [Constricted] : constricted [Clear] : clear [Linear/Goal Directed] : linear/goal directed [WNL] : within normal limits [None Reported] : none reported [Mild] : mild [Moderate] : moderate [FreeTextEntry8] : less depressed [de-identified] : poor

## 2023-12-21 NOTE — FAMILY HISTORY
[FreeTextEntry1] : Family composition: wife, mom, aunt and one cousin in [place] only good relationship with mom. Father passed away in 2008. \par  Family history and background: Patient identifies as Chinese American\par  Family relationship: Patient reports that he has a good relationship with is family\par  Pertinent Family Medical, MH and Substance Use History including Adult Child of Alcoholic and child of substance abuse status; history of cancer and heart disease: Patient reports having hypertension, no history of substance use, the patient denied family history of substance use, breast cancer on mothers side and thyroid cancer.\par

## 2023-12-21 NOTE — SOCIAL HISTORY
[FreeTextEntry1] : Legal Status \par  Does individual Served have a Legal Guardian, rep Payee or Conservatorship?\par  [ X] No\par  [ ] Yes - Details: [ ] \par  \par  Legal Involvement history \par  Does the individual have a history of or current involvement with the legal system? \par  [ X] No\par  [ ] Yes - Details: [ ] \par  \par   Services \par  Have you ever served in the ? \par  [X ] No\par  [ ] Yes - Details: [ ] \par  \par  Employment history: Patient is currently a . Patient is a member of the [employer] department for over 20 years.\par  \par  Developmental history: N/A \par  \par  Sexual hx/identity Sexual History/ Concern (include sexual orientation and other relevant information): Heterosexual cisgender male \par  \par  Race - ethnicity - culture information:  American born in [place] and [place]. Patient reports that he is a naturalized citizen. \par  \par  \par  Social supports (friends, Volunteers, club, AA meeting, other meetings ) ? Patient reports that his wife, Joyce is his only social support. \par  \par  Meaningful Activities: video games\par  \par  \par  Spiritual Assessment Tool - [employer]\par  F. What is your carmen or belief? Christian\par  Do you consider yourself spiritual or Episcopalian? No \par  Is there something you believe in that gives meaning to your life? Joyce my wife \par  I: Is it important in your life? Yes \par  What influence does it have on how you take care of yourself? "I want to be a better person overall" \par  How have your beliefs influenced your behavior during this illness? What role do your beliefs play in regaining your health?  "I follow the teachings of Buddha" \par  C. Are you part of a spiritual or Episcopalian community? No \par  Is this of support to you and how? N/A \par  Is there a person or group of people you really love or who are really important to you? Joyce , mom and aunt \par  H. We have been discussing your belief and supports. What else gives you internal support? No\par  What are your sources of hope, strength, comfort and peace? "being home in my safe place" \par  What do you hold on to during difficult times? No \par  what sustains you and keeps you going? My wife \par  A. How would you like me, your healthcare provider, to address these issues in your healthcare? Talk about it \par  \par  SMOKING CESSATION \par  Do you Smoke?                              [ X] Yes		[ ] No\par  Do you want to quit? 		[ ] Yes		[X ] No\par  -ASK\par  	Number of cigarettes   [ ] cigars [ ]  pipe bowls [20 ]  per day \par  	Number of ST cans/ pouches per week [ 7]\par  	Number of years used [ over 30 years ]\par  	How soon after you wake up do you use tobacco?  [X ] within 30 minutes      [ ] more than 30 minutes \par  	Previous quit attempts:  # of attempts [ ] longest quit period [ ] methods(s) used [ ]- Never tried to quit smoking\par  How long ago was last attempt to quit  [ ] years [ ] months- NA \par  	Reasons for wanting to quit[ ] Patient reports that he does not want to quit smoking \par  -ADVISE   about the oral benefits of quitting\par  -ASSESS   willingness to make a quit attempt (Stage of Change) \par  	    [ ] Precontemplation (Stop here & Reassess next visit)	[ ] Contemplation [ ] Preparation  \par  -ASSIST    (depending on stage of change) \par  	Self-Help Pamphlets & Materials\par  	List of local community group/individual quit programs and phone help lines\par  	Encourage a quit date (for those who are ready)\par  	Pharmacotherapy: Nicotine gum/ Lozenge/ Patch/ Inhaler/NS/Zyban/ Chantix\par   	RX: 	[ ]  () \par  -ARRANGE  Follow up if set a quit date (with permission)\par  Quit Date: [ ]  Phone calls or visits:  [ ] Week 1-2  Months   [ ] 1   [ ] 3   [ ] 6   [ ] 12  \par  -Yearly Reassessment    [ ] No Changes of Smoking [ ] Change of Smoking Habit (Non-Smoker to Smoker/ Smoker to Non Smoker)\par  (If there is change from Non Smoker to Smoker, please fill out new BRIEF TOBACCO CESSATION INTERVENTION FORM) \par  Date of Yearly Reassessment : [ ]\par  Comments:  [ ]\par  \par

## 2023-12-21 NOTE — RISK ASSESSMENT
[No, patient denies ideation or behavior] : No, patient denies ideation or behavior [Clinical Interview] : Clinical Interview [No] : No [Mood disorder] : mood disorder [PTSD] : PTSD [Identifies reasons for living] : identifies reasons for living [Supportive social network of family or friends] : supportive social network of family or friends [Orthodoxy beliefs] : Buddhism beliefs [Cultural, spiritual and/or moral attitudes against suicide] : cultural, spiritual and/or moral attitudes against suicide [Positive therapeutic relationships] : positive therapeutic relationships [None in the patient's lifetime] : None in the patient's lifetime [None Known] : none known [No known risk factors] : No known risk factors [Residential stability] : residential stability [Employment stability] : employment stability [Sobriety] : sobriety [Yes] : yes [de-identified] : the patient reports that he owns firearms that is stored away safely

## 2023-12-22 DIAGNOSIS — F41.1 GENERALIZED ANXIETY DISORDER: ICD-10-CM

## 2023-12-22 DIAGNOSIS — F40.01 AGORAPHOBIA WITH PANIC DISORDER: ICD-10-CM

## 2023-12-22 DIAGNOSIS — F43.10 POST-TRAUMATIC STRESS DISORDER, UNSPECIFIED: ICD-10-CM

## 2023-12-22 DIAGNOSIS — F33.1 MAJOR DEPRESSIVE DISORDER, RECURRENT, MODERATE: ICD-10-CM

## 2023-12-29 ENCOUNTER — APPOINTMENT (OUTPATIENT)
Dept: PSYCHIATRY | Facility: CLINIC | Age: 48
End: 2023-12-29

## 2023-12-29 ENCOUNTER — OUTPATIENT (OUTPATIENT)
Dept: OUTPATIENT SERVICES | Facility: HOSPITAL | Age: 48
LOS: 1 days | End: 2023-12-29
Payer: COMMERCIAL

## 2023-12-29 DIAGNOSIS — F43.10 POST-TRAUMATIC STRESS DISORDER, UNSPECIFIED: ICD-10-CM

## 2023-12-29 PROCEDURE — 90832 PSYTX W PT 30 MINUTES: CPT

## 2023-12-29 NOTE — PLAN
[FreeTextEntry2] : Goal #1- Management of depression  Obj #1- The patient will identify three triggers for his depression and will verbalize and utilize three effective coping mechanisms  Obj #2-  The patient will attend medication management appointments and will take his psychotropic medications as prescribed   Goal #2- Management of anxiety  Obj #2-  The patient will verbalize three triggers for anxiety and will verbalize and utilize at least three effective coping mechanisms for anxiety [Cognitive and/or Behavior Therapy] : Cognitive and/or Behavior Therapy  [Skills training (all types)] : Skills training (all types)  [Supportive Therapy] : Supportive Therapy [de-identified] : The patient attended his scheduled session with the writer via [employer]. The patient noted that since the initiation of his newly prescribed psychotropic medication he has been feeling tired in the morning, thirsty, dizzy and experiencing headaches. Patient noted that he has not had much of an appetite and has been eating about once per day. The writer encouraged to bring up these new side effects to his psychiatrist during his next scheduled medication management appointment.  Patient noted that he has noticed much changes to his anxiety and depression. Patient noted that he has been taking some time off of work as he feels that he is not "ready" to return. Patient noted that he continues to struggle with his wife not being at home as she is caring for her grandmother. This led to a discussion regarding how grief can sometimes cause individuals to want to spend an increase amount of time with loved ones. The writer and the patient discussed DBT skill of radical acceptance and how he can utilize the skill to evaluate various scenarios. The patient reports medication adherence. The writer will continue to provide the patient with support and encouragement where needed. The patient is scheduled for a follow up appointment on 1/4/2024 at 11am. The patient requested to be seen weekly as he processes the passing of his mother.  [Recommended Frequency of Visits: ____] : Recommended frequency of visits: [unfilled] [Return in ____ week(s)] : Return in [unfilled] week(s)

## 2023-12-29 NOTE — REASON FOR VISIT
[Patient preference] : as per patient preference [Continuing, patient seen in-person within last 12 months] : Telehealth services are continuing as patient has been seen in-person within last 12 months. [Telehealth (audio & video) - Individual/Group] : This visit was provided via telehealth using real-time 2-way audio visual technology. [Medical Office: ([facility])___] : The provider was located at the medical office in [unfilled]. [Home] : The patient, [unfilled], was located at home, [unfilled], at the time of the visit. [Verbal consent obtained from patient/other participant(s)] : Verbal consent for telehealth/telephonic services obtained from patient/other participant(s) [FreeTextEntry4] : 11:31am  [FreeTextEntry5] : 11:51am  [FreeTextEntry3] : the patient requested telehealth sessions and is appropriate for telehealth services  [Patient] : Patient [FreeTextEntry1] : psychotherapy follow up

## 2023-12-30 DIAGNOSIS — F43.10 POST-TRAUMATIC STRESS DISORDER, UNSPECIFIED: ICD-10-CM

## 2024-01-04 ENCOUNTER — APPOINTMENT (OUTPATIENT)
Dept: PSYCHIATRY | Facility: CLINIC | Age: 49
End: 2024-01-04

## 2024-01-04 ENCOUNTER — OUTPATIENT (OUTPATIENT)
Dept: OUTPATIENT SERVICES | Facility: HOSPITAL | Age: 49
LOS: 1 days | End: 2024-01-04
Payer: COMMERCIAL

## 2024-01-04 DIAGNOSIS — F43.10 POST-TRAUMATIC STRESS DISORDER, UNSPECIFIED: ICD-10-CM

## 2024-01-04 PROCEDURE — 90832 PSYTX W PT 30 MINUTES: CPT

## 2024-01-04 NOTE — REASON FOR VISIT
[Patient preference] : as per patient preference [Continuing, patient not seen in-person within last 12 months (provide details below)] : Telehealth services are continuing, patient not seen in-person within last 12 months.  [Telehealth (audio & video) - Individual/Group] : This visit was provided via telehealth using real-time 2-way audio visual technology. [Other Location: e.g. Home (Enter Location, City,State)___] : The provider was located at [unfilled]. [Home] : The patient, [unfilled], was located at home, [unfilled], at the time of the visit. [Verbal consent obtained from patient/other participant(s)] : Verbal consent for telehealth/telephonic services obtained from patient/other participant(s) [FreeTextEntry4] : 11am  [FreeTextEntry5] : 11:17am  [FreeTextEntry3] : the patient requested telehealth sessions and is appropriate for telehealth services  [Patient] : Patient [FreeTextEntry1] : psychotherapy follow up

## 2024-01-04 NOTE — PLAN
[FreeTextEntry2] : Goal #1- Management of depression  Obj #1- The patient will identify three triggers for his depression and will verbalize and utilize three effective coping mechanisms  Obj #2-  The patient will attend medication management appointments and will take his psychotropic medications as prescribed   Goal #2- Management of anxiety  Obj #2-  The patient will verbalize three triggers for anxiety and will verbalize and utilize at least three effective coping mechanisms for anxiety [Cognitive and/or Behavior Therapy] : Cognitive and/or Behavior Therapy  [Skills training (all types)] : Skills training (all types)  [Supportive Therapy] : Supportive Therapy [de-identified] : The patient attended his scheduled session with the writer via [employer]. The patient noted that he had approximately 2-3 days where "I was feeling better". The writer explored this with the patient further and he explained that those couple of days he woke up feeling motivated and he was able to get tasks around the home completed. The writer and the patient discussed ways that the patient can keep this momentum going including having a daily routine that he adheres to. This led to a discussion regarding how the patient can create this scheduled based off of tasks that he would like to accomplish during the day.  Patient noted improved sleep however, explained that he  wakes up at night due to a bad dream however, he notes that he is able to fall asleep at night. Patient reported that he has noticed that his nicotine cravings has greatly decreased. Psychoeducation was provided to the patient regarding how this is a side effect of the Wellbutrin. The patient reports medication adherence. The writer will continue to provide the patient with support and encouragement where needed. The patient is scheduled for a follow up appointment on 1/11/2024 at 12:30pm via telehealth per the patient's request.  [Recommended Frequency of Visits: ____] : Recommended frequency of visits: [unfilled] [Return in ____ week(s)] : Return in [unfilled] week(s)

## 2024-01-05 DIAGNOSIS — F43.10 POST-TRAUMATIC STRESS DISORDER, UNSPECIFIED: ICD-10-CM

## 2024-01-11 ENCOUNTER — APPOINTMENT (OUTPATIENT)
Dept: PSYCHIATRY | Facility: CLINIC | Age: 49
End: 2024-01-11

## 2024-01-11 ENCOUNTER — OUTPATIENT (OUTPATIENT)
Dept: OUTPATIENT SERVICES | Facility: HOSPITAL | Age: 49
LOS: 1 days | End: 2024-01-11
Payer: COMMERCIAL

## 2024-01-11 DIAGNOSIS — F43.10 POST-TRAUMATIC STRESS DISORDER, UNSPECIFIED: ICD-10-CM

## 2024-01-11 PROCEDURE — 90832 PSYTX W PT 30 MINUTES: CPT

## 2024-01-11 NOTE — REASON FOR VISIT
[Patient preference] : as per patient preference [Continuing, patient seen in-person within last 12 months] : Telehealth services are continuing as patient has been seen in-person within last 12 months. [Telehealth (audio & video) - Individual/Group] : This visit was provided via telehealth using real-time 2-way audio visual technology. [Other Location: e.g. Home (Enter Location, City,State)___] : The provider was located at [unfilled]. [Home] : The patient, [unfilled], was located at home, [unfilled], at the time of the visit. [Verbal consent obtained from patient/other participant(s)] : Verbal consent for telehealth/telephonic services obtained from patient/other participant(s) [FreeTextEntry4] : 12:31pm  [FreeTextEntry5] : 12:48pm  [FreeTextEntry3] : the patient requested telehealth sessions and is appropriate for telehealth services  [Patient] : Patient [FreeTextEntry1] : psychotherapy follow up
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
## 2024-01-12 DIAGNOSIS — F43.10 POST-TRAUMATIC STRESS DISORDER, UNSPECIFIED: ICD-10-CM

## 2024-01-18 ENCOUNTER — OUTPATIENT (OUTPATIENT)
Dept: OUTPATIENT SERVICES | Facility: HOSPITAL | Age: 49
LOS: 1 days | End: 2024-01-18
Payer: COMMERCIAL

## 2024-01-18 ENCOUNTER — APPOINTMENT (OUTPATIENT)
Dept: PSYCHIATRY | Facility: CLINIC | Age: 49
End: 2024-01-18

## 2024-01-18 DIAGNOSIS — F43.10 POST-TRAUMATIC STRESS DISORDER, UNSPECIFIED: ICD-10-CM

## 2024-01-18 PROCEDURE — 90832 PSYTX W PT 30 MINUTES: CPT | Mod: 95

## 2024-01-18 NOTE — PLAN
[FreeTextEntry2] : Goal #1- Management of depression  Obj #1- The patient will identify three triggers for his depression and will verbalize and utilize three effective coping mechanisms  Obj #2-  The patient will attend medication management appointments and will take his psychotropic medications as prescribed   Goal #2- Management of anxiety  Obj #2-  The patient will verbalize three triggers for anxiety and will verbalize and utilize at least three effective coping mechanisms for anxiety [Cognitive and/or Behavior Therapy] : Cognitive and/or Behavior Therapy  [Skills training (all types)] : Skills training (all types)  [Supportive Therapy] : Supportive Therapy [de-identified] : The patient attended his scheduled session with the writer via [employer]. The patient reported that not much has changed in regards to his anxiety and mood. Patient explained that he continues to have periods where he is not motivated to do much tasks. The patient and the writer discussed the patient having a routine as he is home on leave from work temporarily. The writer and the patient discussed making a lists of tasks that he needs to complete in an effort to keep himself busy. Patient verbalized needing to fix his faucet as well as purchasing  containers to put his late mother's belongings in. The writer commended the patient. Patient explained having dreams about individuals that have since passed on. Patient reports difficulty making decisions without his wife and difficulty coping with her not being home due to other care giving activities. The writer actively listened to the patient, validated his feelings and support was rendered. The patient reports medication adherence. The writer will continue to provide the patient with support and encouragement where needed. The patient is scheduled for a follow up appointment on 1/25/024 at 11:30am via telehealth per the patient's request.  [Recommended Frequency of Visits: ____] : Recommended frequency of visits: [unfilled] [Return in ____ week(s)] : Return in [unfilled] week(s)

## 2024-01-18 NOTE — REASON FOR VISIT
[Patient preference] : as per patient preference [Continuing, patient not seen in-person within last 12 months (provide details below)] : Telehealth services are continuing, patient not seen in-person within last 12 months.  [Telehealth (audio & video) - Individual/Group] : This visit was provided via telehealth using real-time 2-way audio visual technology. [Other Location: e.g. Home (Enter Location, City,State)___] : The provider was located at [unfilled]. [Home] : The patient, [unfilled], was located at home, [unfilled], at the time of the visit. [Verbal consent obtained from patient/other participant(s)] : Verbal consent for telehealth/telephonic services obtained from patient/other participant(s) [FreeTextEntry4] : 11:30am  [FreeTextEntry5] : 11:46am  [FreeTextEntry3] : the patient requested telehealth sessions and is appropriate for telehealth services  [Patient] : Patient [FreeTextEntry1] : psychotherapy follow up

## 2024-01-19 DIAGNOSIS — F43.10 POST-TRAUMATIC STRESS DISORDER, UNSPECIFIED: ICD-10-CM

## 2024-01-22 ENCOUNTER — APPOINTMENT (OUTPATIENT)
Dept: PSYCHIATRY | Facility: CLINIC | Age: 49
End: 2024-01-22
Payer: COMMERCIAL

## 2024-01-22 ENCOUNTER — OUTPATIENT (OUTPATIENT)
Dept: OUTPATIENT SERVICES | Facility: HOSPITAL | Age: 49
LOS: 1 days | End: 2024-01-22
Payer: COMMERCIAL

## 2024-01-22 DIAGNOSIS — F43.10 POST-TRAUMATIC STRESS DISORDER, UNSPECIFIED: ICD-10-CM

## 2024-01-22 PROCEDURE — 99214 OFFICE O/P EST MOD 30 MIN: CPT | Mod: 95

## 2024-01-22 RX ORDER — BUPROPION HYDROCHLORIDE 150 MG/1
150 TABLET, EXTENDED RELEASE ORAL DAILY
Qty: 30 | Refills: 1 | Status: COMPLETED | COMMUNITY
Start: 2023-12-21 | End: 2024-01-22

## 2024-01-22 NOTE — FAMILY HISTORY
[FreeTextEntry1] : Family composition: wife, mom, aunt and one cousin in [place] only good relationship with mom. Father passed away in 2008. \par  Family history and background: Patient identifies as Chinese American\par  Family relationship: Patient reports that he has a good relationship with is family\par  Pertinent Family Medical, MH and Substance Use History including Adult Child of Alcoholic and child of substance abuse status; history of cancer and heart disease: Patient reports having hypertension, no history of substance use, the patient denied family history of substance use, breast cancer on mothers side and thyroid cancer.\par

## 2024-01-22 NOTE — PHYSICAL EXAM
[None] : none [Average] : average [Intermittent] : intermittent [Slowed] : slowed [Cooperative] : cooperative [Depressed] : depressed [Anxious] : anxious [Constricted] : constricted [Clear] : clear [Linear/Goal Directed] : linear/goal directed [None Reported] : none reported [WNL] : within normal limits [Mild] : mild [Moderate] : moderate [FreeTextEntry8] : less depressed [de-identified] : poor

## 2024-01-22 NOTE — HISTORY OF PRESENT ILLNESS
[FreeTextEntry1] : 1/22  Pt is unsure if Wellbutrin is helpful for his anxiety/depression and motivation. Pt reports he continues to experience nightmares about people passing away like his father in law and grandpa. Pt states he experiences nightmares a few nights in a week. Pt states he is able to fall asleep earlier. Pt continues to endorse poor appetite. Pt reports benefit from therapy sessions. Pt reports he continues to struggle with ADL's such as showering. Pt is trying to set up a daily schedule.  Pt denies any active or passive SI/HI/AVH at this time.  [FreeTextEntry2] : see above [FreeTextEntry3] : none
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
## 2024-01-22 NOTE — SOCIAL HISTORY
[FreeTextEntry1] : Legal Status \par  Does individual Served have a Legal Guardian, rep Payee or Conservatorship?\par  [ X] No\par  [ ] Yes - Details: [ ] \par  \par  Legal Involvement history \par  Does the individual have a history of or current involvement with the legal system? \par  [ X] No\par  [ ] Yes - Details: [ ] \par  \par   Services \par  Have you ever served in the ? \par  [X ] No\par  [ ] Yes - Details: [ ] \par  \par  Employment history: Patient is currently a . 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 Patient reports that he is a naturalized citizen. \par  \par  \par  Social supports (friends, Volunteers, club, AA meeting, other meetings ) ? Patient reports that his wife, Joyce is his only social support. \par  \par  Meaningful Activities: video games\par  \par  \par  Spiritual Assessment Tool - FICA\par  F. What is your carmen or belief? Yarsanism\par  Do you consider yourself spiritual or Sabianism? No \par  Is there something you believe in that gives meaning to your life? Joyce my wife \par  I: Is it important in your life? Yes \par  What influence does it have on how you take care of yourself? "I want to be a better person overall" \par  How have your beliefs influenced your behavior during this illness? What role do your beliefs play in regaining your health?  "I follow the teachings of Buddha" \par  C. Are you part of a spiritual or Sabianism community? No \par  Is this of support to you and how? N/A \par  Is there a person or group of people you really love or who are really important to you? Joyce , mom and aunt \par  H. We have been discussing your belief and supports. What else gives you internal support? No\par  What are your sources of hope, strength, comfort and peace? "being home in my safe place" \par  What do you hold on to during difficult times? No \par  what sustains you and keeps you going? My wife \par  A. How would you like me, your healthcare provider, to address these issues in your healthcare? Talk about it \par  \par  SMOKING CESSATION \par  Do you Smoke?                              [ X] Yes		[ ] No\par  Do you want to quit? 		[ ] Yes		[X ] No\par  -ASK\par  	Number of cigarettes   [ ] cigars [ ]  pipe bowls [20 ]  per day \par  	Number of ST cans/ pouches per week [ 7]\par  	Number of years used [ over 30 years ]\par  	How soon after you wake up do you use tobacco?  [X ] within 30 minutes      [ ] more than 30 minutes \par  	Previous quit attempts:  # of attempts [ ] longest quit period [ ] methods(s) used [ ]- Never tried to quit smoking\par  How long ago was last attempt to quit  [ ] years [ ] months- NA \par  	Reasons for wanting to quit[ ] Patient reports that he does not want to quit smoking \par  -ADVISE   about the oral benefits of quitting\par  -ASSESS   willingness to make a quit attempt (Stage of Change) \par  	    [ ] Precontemplation (Stop here & Reassess next visit)	[ ] Contemplation [ ] Preparation  \par  -ASSIST    (depending on stage of change) \par  	Self-Help Pamphlets & Materials\par  	List of local community group/individual quit programs and phone help lines\par  	Encourage a quit date (for those who are ready)\par  	Pharmacotherapy: Nicotine gum/ Lozenge/ Patch/ Inhaler/NS/Zyban/ Chantix\par   	RX: 	[ ]  () \par  -ARRANGE  Follow up if set a quit date (with permission)\par  Quit Date: [ ]  Phone calls or visits:  [ ] Week 1-2  Months   [ ] 1   [ ] 3   [ ] 6   [ ] 12  \par  -Yearly Reassessment    [ ] No Changes of Smoking [ ] Change of Smoking Habit (Non-Smoker to Smoker/ Smoker to Non Smoker)\par  (If there is change from Non Smoker to Smoker, please fill out new BRIEF TOBACCO CESSATION INTERVENTION FORM) \par  Date of Yearly Reassessment : [ ]\par  Comments:  [ ]\par  \par

## 2024-01-22 NOTE — SURGICAL HISTORY
FEVER
[FreeTextEntry1] : The patient reports that he has had bunion surgery between [id number]. Patient reports having chronic foot pain.

## 2024-01-22 NOTE — RISK ASSESSMENT
[No, patient denies ideation or behavior] : No, patient denies ideation or behavior [Clinical Interview] : Clinical Interview [No] : No [Mood disorder] : mood disorder [PTSD] : PTSD [Identifies reasons for living] : identifies reasons for living [Supportive social network of family or friends] : supportive social network of family or friends [Yazidi beliefs] : Mormonism beliefs [Cultural, spiritual and/or moral attitudes against suicide] : cultural, spiritual and/or moral attitudes against suicide [Positive therapeutic relationships] : positive therapeutic relationships [None in the patient's lifetime] : None in the patient's lifetime [None Known] : none known [No known risk factors] : No known risk factors [Residential stability] : residential stability [Employment stability] : employment stability [Sobriety] : sobriety [Yes] : yes [de-identified] : the patient reports that he owns firearms that is stored away safely

## 2024-01-22 NOTE — REASON FOR VISIT
[Telehealth (audio & video) - Individual/Group] : This visit was provided via telehealth using real-time 2-way audio visual technology. [Home] : The patient, [unfilled], was located at home, [unfilled], at the time of the visit. [Verbal consent obtained from patient/other participant(s)] : Verbal consent for telehealth/telephonic services obtained from patient/other participant(s) [OK  to leave message] : OK  to leave message [FreeTextEntry5] : Cantonese  [FreeTextEntry6] : Maxime  [FreeTextEntry7] : he/him/his [Community Based Organization] : Community Based Organization [Non-[facility] Provider/Facility] : Non-[facility] Provider/Facility [Patient] : Patient [Collateral - Name/Contact Info/Relationship:___] : Collateral: [unfilled] [FreeTextEntry2] : evaluation for anxiety,depression and PTSD related to 9/11 exposure  [FreeTextEntry1] : "Since I was certified I have PTSD. I have nightmares sometimes. I have more anxiety" \par

## 2024-01-23 DIAGNOSIS — F43.10 POST-TRAUMATIC STRESS DISORDER, UNSPECIFIED: ICD-10-CM

## 2024-01-25 ENCOUNTER — APPOINTMENT (OUTPATIENT)
Dept: PSYCHIATRY | Facility: CLINIC | Age: 49
End: 2024-01-25

## 2024-01-25 ENCOUNTER — OUTPATIENT (OUTPATIENT)
Dept: OUTPATIENT SERVICES | Facility: HOSPITAL | Age: 49
LOS: 1 days | End: 2024-01-25
Payer: COMMERCIAL

## 2024-01-25 DIAGNOSIS — F43.10 POST-TRAUMATIC STRESS DISORDER, UNSPECIFIED: ICD-10-CM

## 2024-01-25 PROCEDURE — 90832 PSYTX W PT 30 MINUTES: CPT

## 2024-01-26 DIAGNOSIS — F43.10 POST-TRAUMATIC STRESS DISORDER, UNSPECIFIED: ICD-10-CM

## 2024-02-01 ENCOUNTER — OUTPATIENT (OUTPATIENT)
Dept: OUTPATIENT SERVICES | Facility: HOSPITAL | Age: 49
LOS: 1 days | End: 2024-02-01
Payer: COMMERCIAL

## 2024-02-01 ENCOUNTER — APPOINTMENT (OUTPATIENT)
Dept: PSYCHIATRY | Facility: CLINIC | Age: 49
End: 2024-02-01
Payer: COMMERCIAL

## 2024-02-01 DIAGNOSIS — F43.10 POST-TRAUMATIC STRESS DISORDER, UNSPECIFIED: ICD-10-CM

## 2024-02-01 DIAGNOSIS — F41.1 GENERALIZED ANXIETY DISORDER: ICD-10-CM

## 2024-02-01 PROCEDURE — 99214 OFFICE O/P EST MOD 30 MIN: CPT | Mod: 95

## 2024-02-01 NOTE — DISCUSSION/SUMMARY
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 Pt denies any mental health conditions prior to 9/11), no prior IPP, no SA, never on meds, c/o feeling depressed, having anhedonia, apathy, agoraphobia, flashbacks, hypervigilance, etc. Pt was Dxd with PTSD by 9/11 fund and referred to us for treatment. Denies MELINA.  2/1 Will increase Trazdone to aid with middle insomnia (waking up 2 to 3x in the night). Recommended pt to cut down caffeine use. Pt in agreement  1. Next appt in 3 weeks 2. continue Prozac  50mg qdaily 4. increase Trazodone to 100mg qhs PRN for insomnia

## 2024-02-01 NOTE — HISTORY OF PRESENT ILLNESS
[FreeTextEntry1] : 2/1 Pt continues to report low, depressed mood and has not noticed any significant improvement with recent increase in Prozac so far. Pt continues to endorse middle insomnia, reports having to use the bathroom and feeling thirsty and dizzy. Pt admits to drinking 5 to 6 cups of coffee per day to stay awake. Pt reports feeling tired and sleepy during the day.  Pt continues to endorse poor appetite. Pt reports benefit from therapy sessions. Pt reports he continues to struggle with ADL's such as showering. Pt is trying to set up a daily schedule.  Pt denies any active or passive SI/HI/AVH at this time.  [FreeTextEntry2] : see above [FreeTextEntry3] : none

## 2024-02-01 NOTE — SOCIAL HISTORY
[FreeTextEntry1] : Legal Status \par  Does individual Served have a Legal Guardian, rep Payee or Conservatorship?\par  [ X] No\par  [ ] Yes - Details: [ ] \par  \par  Legal Involvement history \par  Does the individual have a history of or current involvement with the legal system? \par  [ X] No\par  [ ] Yes - Details: [ ] \par  \par   Services \par  Have you ever served in the ? \par  [X ] No\par  [ ] Yes - Details: [ ] \par  \par  Employment history: Patient is currently a . 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 Patient reports that he is a naturalized citizen. \par  \par  \par  Social supports (friends, Volunteers, club, AA meeting, other meetings ) ? Patient reports that his wife, Joyce is his only social support. \par  \par  Meaningful Activities: video games\par  \par  \par  Spiritual Assessment Tool - FICA\par  F. What is your carmen or belief? Methodist\par  Do you consider yourself spiritual or Confucianism? No \par  Is there something you believe in that gives meaning to your life? Joyce my wife \par  I: Is it important in your life? Yes \par  What influence does it have on how you take care of yourself? "I want to be a better person overall" \par  How have your beliefs influenced your behavior during this illness? What role do your beliefs play in regaining your health?  "I follow the teachings of Buddha" \par  C. Are you part of a spiritual or Confucianism community? No \par  Is this of support to you and how? N/A \par  Is there a person or group of people you really love or who are really important to you? Joyce , mom and aunt \par  H. We have been discussing your belief and supports. What else gives you internal support? No\par  What are your sources of hope, strength, comfort and peace? "being home in my safe place" \par  What do you hold on to during difficult times? No \par  what sustains you and keeps you going? My wife \par  A. How would you like me, your healthcare provider, to address these issues in your healthcare? Talk about it \par  \par  SMOKING CESSATION \par  Do you Smoke?                              [ X] Yes		[ ] No\par  Do you want to quit? 		[ ] Yes		[X ] No\par  -ASK\par  	Number of cigarettes   [ ] cigars [ ]  pipe bowls [20 ]  per day \par  	Number of ST cans/ pouches per week [ 7]\par  	Number of years used [ over 30 years ]\par  	How soon after you wake up do you use tobacco?  [X ] within 30 minutes      [ ] more than 30 minutes \par  	Previous quit attempts:  # of attempts [ ] longest quit period [ ] methods(s) used [ ]- Never tried to quit smoking\par  How long ago was last attempt to quit  [ ] years [ ] months- NA \par  	Reasons for wanting to quit[ ] Patient reports that he does not want to quit smoking \par  -ADVISE   about the oral benefits of quitting\par  -ASSESS   willingness to make a quit attempt (Stage of Change) \par  	    [ ] Precontemplation (Stop here & Reassess next visit)	[ ] Contemplation [ ] Preparation  \par  -ASSIST    (depending on stage of change) \par  	Self-Help Pamphlets & Materials\par  	List of local community group/individual quit programs and phone help lines\par  	Encourage a quit date (for those who are ready)\par  	Pharmacotherapy: Nicotine gum/ Lozenge/ Patch/ Inhaler/NS/Zyban/ Chantix\par   	RX: 	[ ]  () \par  -ARRANGE  Follow up if set a quit date (with permission)\par  Quit Date: [ ]  Phone calls or visits:  [ ] Week 1-2  Months   [ ] 1   [ ] 3   [ ] 6   [ ] 12  \par  -Yearly Reassessment    [ ] No Changes of Smoking [ ] Change of Smoking Habit (Non-Smoker to Smoker/ Smoker to Non Smoker)\par  (If there is change from Non Smoker to Smoker, please fill out new BRIEF TOBACCO CESSATION INTERVENTION FORM) \par  Date of Yearly Reassessment : [ ]\par  Comments:  [ ]\par  \par

## 2024-02-01 NOTE — RISK ASSESSMENT
[No, patient denies ideation or behavior] : No, patient denies ideation or behavior [Clinical Interview] : Clinical Interview [No] : No [Mood disorder] : mood disorder [PTSD] : PTSD [Identifies reasons for living] : identifies reasons for living [Supportive social network of family or friends] : supportive social network of family or friends [Uatsdin beliefs] : Restorationism beliefs [Cultural, spiritual and/or moral attitudes against suicide] : cultural, spiritual and/or moral attitudes against suicide [Positive therapeutic relationships] : positive therapeutic relationships [None in the patient's lifetime] : None in the patient's lifetime [None Known] : none known [No known risk factors] : No known risk factors [Residential stability] : residential stability [Employment stability] : employment stability [Sobriety] : sobriety [Yes] : yes [de-identified] : the patient reports that he owns firearms that is stored away safely

## 2024-02-01 NOTE — SURGICAL HISTORY
[FreeTextEntry1] : The patient reports that he has had bunion surgery between [id number]. Patient reports having chronic foot pain.

## 2024-02-01 NOTE — FAMILY HISTORY
[FreeTextEntry1] : Family composition: wife, mom, aunt and one cousin in [place] only good relationship with mom. Father passed away in 2008. \par  Family history and background: Patient identifies as Chinese American\par  Family relationship: Patient reports that he has a good relationship with is family\par  Pertinent Family Medical, MH and Substance Use History including Adult Child of Alcoholic and child of substance abuse status; history of cancer and heart disease: Patient reports having hypertension, no history of substance use, the patient denied family history of substance use, breast cancer on mothers side and thyroid cancer.\par

## 2024-02-01 NOTE — PHYSICAL EXAM
[None] : none [Average] : average [Intermittent] : intermittent [Slowed] : slowed [Cooperative] : cooperative [Depressed] : depressed [Anxious] : anxious [Constricted] : constricted [Clear] : clear [Linear/Goal Directed] : linear/goal directed [WNL] : within normal limits [None Reported] : none reported [Mild] : mild [Moderate] : moderate [FreeTextEntry8] : less depressed [de-identified] : poor

## 2024-02-02 DIAGNOSIS — F43.10 POST-TRAUMATIC STRESS DISORDER, UNSPECIFIED: ICD-10-CM

## 2024-02-08 ENCOUNTER — OUTPATIENT (OUTPATIENT)
Dept: OUTPATIENT SERVICES | Facility: HOSPITAL | Age: 49
LOS: 1 days | End: 2024-02-08
Payer: COMMERCIAL

## 2024-02-08 ENCOUNTER — APPOINTMENT (OUTPATIENT)
Dept: PSYCHIATRY | Facility: CLINIC | Age: 49
End: 2024-02-08

## 2024-02-08 DIAGNOSIS — F43.10 POST-TRAUMATIC STRESS DISORDER, UNSPECIFIED: ICD-10-CM

## 2024-02-08 PROCEDURE — 90832 PSYTX W PT 30 MINUTES: CPT

## 2024-02-08 NOTE — REASON FOR VISIT
[Patient preference] : as per patient preference [Continuing, patient not seen in-person within last 12 months (provide details below)] : Telehealth services are continuing, patient not seen in-person within last 12 months.  [Telehealth (audio & video) - Individual/Group] : This visit was provided via telehealth using real-time 2-way audio visual technology. [Other Location: e.g. Home (Enter Location, City,State)___] : The provider was located at [unfilled]. [Home] : The patient, [unfilled], was located at home, [unfilled], at the time of the visit. [Verbal consent obtained from patient/other participant(s)] : Verbal consent for telehealth/telephonic services obtained from patient/other participant(s) [FreeTextEntry4] : 12pm  [FreeTextEntry5] : 12:17pm  [FreeTextEntry3] : the patient requested telehealth sessions and is appropriate for telehealth services  [Patient] : Patient [FreeTextEntry1] : psychotherapy follow up

## 2024-02-08 NOTE — PLAN
[FreeTextEntry2] : Goal #1- Management of depression  Obj #1- The patient will identify three triggers for his depression and will verbalize and utilize three effective coping mechanisms  Obj #2-  The patient will attend medication management appointments and will take his psychotropic medications as prescribed   Goal #2- Management of anxiety  Obj #2-  The patient will verbalize three triggers for anxiety and will verbalize and utilize at least three effective coping mechanisms for anxiety [Cognitive and/or Behavior Therapy] : Cognitive and/or Behavior Therapy  [Skills training (all types)] : Skills training (all types)  [Supportive Therapy] : Supportive Therapy [de-identified] : The patient attended his scheduled session with the writer via [employer]. The patient reported ongoing periods of anxiety and  low mood. The patient's [place] paperwork was filled out by the patient and was forwarded to the patient. The patient noted that he has slept a little better since his last appointment and he noted that he has been keeping himself occupied. The patient reported that he will be preparing for Chinese New Year's. Patient explained that he called [facility] to inquire about referrals for specialty medical providers. The patient noted that he is awaiting a follow up appointment. The patient and the writer discussed ongoing behavioral activation and self care activities. The patient reports medication adherence. The writer will continue to provide the patient with support and encouragement where needed. The patient is scheduled for a follow up appointment on 2/15/2024 at 12pm via telehealth per the patient's request.

## 2024-02-09 DIAGNOSIS — F43.10 POST-TRAUMATIC STRESS DISORDER, UNSPECIFIED: ICD-10-CM

## 2024-02-15 ENCOUNTER — APPOINTMENT (OUTPATIENT)
Dept: PSYCHIATRY | Facility: CLINIC | Age: 49
End: 2024-02-15

## 2024-02-15 ENCOUNTER — OUTPATIENT (OUTPATIENT)
Dept: OUTPATIENT SERVICES | Facility: HOSPITAL | Age: 49
LOS: 1 days | End: 2024-02-15
Payer: COMMERCIAL

## 2024-02-15 DIAGNOSIS — F43.10 POST-TRAUMATIC STRESS DISORDER, UNSPECIFIED: ICD-10-CM

## 2024-02-15 PROCEDURE — 90832 PSYTX W PT 30 MINUTES: CPT

## 2024-02-15 NOTE — END OF VISIT
[Duration of Psychotherapy Visit (minutes spent in synchronous communication): ____] : Duration of Psychotherapy Visit (minutes spent in synchronous communication): [unfilled] [Individual Psychotherapy for 16-37 minutes] : Individual Psychotherapy for 16-37 minutes [Licensed Clinician] : Licensed Clinician Cephalexin Counseling: I counseled the patient regarding use of cephalexin as an antibiotic for prophylactic and/or therapeutic purposes. Cephalexin (commonly prescribed under brand name Keflex) is a cephalosporin antibiotic which is active against numerous classes of bacteria, including most skin bacteria. Side effects may include nausea, diarrhea, gastrointestinal upset, rash, hives, yeast infections, and in rare cases, hepatitis, kidney disease, seizures, fever, confusion, neurologic symptoms, and others. Patients with severe allergies to penicillin medications are cautioned that there is about a 10% incidence of cross-reactivity with cephalosporins. When possible, patients with penicillin allergies should use alternatives to cephalosporins for antibiotic therapy.

## 2024-02-15 NOTE — PLAN
[FreeTextEntry2] : Goal #1- Management of depression  Obj #1- The patient will identify three triggers for his depression and will verbalize and utilize three effective coping mechanisms  Obj #2-  The patient will attend medication management appointments and will take his psychotropic medications as prescribed   Goal #2- Management of anxiety  Obj #2-  The patient will verbalize three triggers for anxiety and will verbalize and utilize at least three effective coping mechanisms for anxiety [Cognitive and/or Behavior Therapy] : Cognitive and/or Behavior Therapy  [Skills training (all types)] : Skills training (all types)  [Supportive Therapy] : Supportive Therapy [de-identified] : The patient attended his scheduled session with the writer via [employer]. The patient noted that he has been feeling "worse" since his last session with the writer. The patient noted an increase in anxiety, depression and nightmares. The writer explored this with the patient, and he reported feeling irritable with periods of road rage while driving. Patient noted that he submitted documents for FMLA leave and he is awaiting a final approval from his HR department. Patient reported feelings of being conflicted as he does not want to return to work as he is often exposed to triggering subject matter however, he notes that he needs to remain employed for financial reasons. The writer assisted the patient with weighing out the pros and cons of the situation. Patient noted that he has been utilizing positive self-talk when he wakes up from a nightmare. The writer commended the patient. Ongoing behavioral activation and self-care is encouraged along with positive self-talk. The writer informed the patient's psychiatrist of the patient's increase of symptoms. The patient reports medication adherence. The writer will continue to provide the patient with support and encouragement where needed. The patient is scheduled for a follow up appointment on2/22/2024 at 12pm via telehealth per the patient's request.  [Recommended Frequency of Visits: ____] : Recommended frequency of visits: [unfilled] [Return in ____ week(s)] : Return in [unfilled] week(s)

## 2024-02-16 DIAGNOSIS — F43.10 POST-TRAUMATIC STRESS DISORDER, UNSPECIFIED: ICD-10-CM

## 2024-02-20 ENCOUNTER — OUTPATIENT (OUTPATIENT)
Dept: OUTPATIENT SERVICES | Facility: HOSPITAL | Age: 49
LOS: 1 days | End: 2024-02-20
Payer: COMMERCIAL

## 2024-02-20 ENCOUNTER — APPOINTMENT (OUTPATIENT)
Dept: PSYCHIATRY | Facility: CLINIC | Age: 49
End: 2024-02-20
Payer: COMMERCIAL

## 2024-02-20 DIAGNOSIS — F43.10 POST-TRAUMATIC STRESS DISORDER, UNSPECIFIED: ICD-10-CM

## 2024-02-20 PROCEDURE — 99214 OFFICE O/P EST MOD 30 MIN: CPT | Mod: 95

## 2024-02-20 NOTE — SOCIAL HISTORY
[FreeTextEntry1] : Legal Status \par  Does individual Served have a Legal Guardian, rep Payee or Conservatorship?\par  [ X] No\par  [ ] Yes - Details: [ ] \par  \par  Legal Involvement history \par  Does the individual have a history of or current involvement with the legal system? \par  [ X] No\par  [ ] Yes - Details: [ ] \par  \par   Services \par  Have you ever served in the ? \par  [X ] No\par  [ ] Yes - Details: [ ] \par  \par  Employment history: Patient is currently a . 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 Patient reports that he is a naturalized citizen. \par  \par  \par  Social supports (friends, Volunteers, club, AA meeting, other meetings ) ? Patient reports that his wife, Joyce is his only social support. \par  \par  Meaningful Activities: video games\par  \par  \par  Spiritual Assessment Tool - FICA\par  F. What is your carmen or belief? Religious\par  Do you consider yourself spiritual or Yazidism? No \par  Is there something you believe in that gives meaning to your life? Joyce my wife \par  I: Is it important in your life? Yes \par  What influence does it have on how you take care of yourself? "I want to be a better person overall" \par  How have your beliefs influenced your behavior during this illness? What role do your beliefs play in regaining your health?  "I follow the teachings of Buddha" \par  C. Are you part of a spiritual or Yazidism community? No \par  Is this of support to you and how? N/A \par  Is there a person or group of people you really love or who are really important to you? Joyce , mom and aunt \par  H. We have been discussing your belief and supports. What else gives you internal support? No\par  What are your sources of hope, strength, comfort and peace? "being home in my safe place" \par  What do you hold on to during difficult times? No \par  what sustains you and keeps you going? My wife \par  A. How would you like me, your healthcare provider, to address these issues in your healthcare? Talk about it \par  \par  SMOKING CESSATION \par  Do you Smoke?                              [ X] Yes		[ ] No\par  Do you want to quit? 		[ ] Yes		[X ] No\par  -ASK\par  	Number of cigarettes   [ ] cigars [ ]  pipe bowls [20 ]  per day \par  	Number of ST cans/ pouches per week [ 7]\par  	Number of years used [ over 30 years ]\par  	How soon after you wake up do you use tobacco?  [X ] within 30 minutes      [ ] more than 30 minutes \par  	Previous quit attempts:  # of attempts [ ] longest quit period [ ] methods(s) used [ ]- Never tried to quit smoking\par  How long ago was last attempt to quit  [ ] years [ ] months- NA \par  	Reasons for wanting to quit[ ] Patient reports that he does not want to quit smoking \par  -ADVISE   about the oral benefits of quitting\par  -ASSESS   willingness to make a quit attempt (Stage of Change) \par  	    [ ] Precontemplation (Stop here & Reassess next visit)	[ ] Contemplation [ ] Preparation  \par  -ASSIST    (depending on stage of change) \par  	Self-Help Pamphlets & Materials\par  	List of local community group/individual quit programs and phone help lines\par  	Encourage a quit date (for those who are ready)\par  	Pharmacotherapy: Nicotine gum/ Lozenge/ Patch/ Inhaler/NS/Zyban/ Chantix\par   	RX: 	[ ]  () \par  -ARRANGE  Follow up if set a quit date (with permission)\par  Quit Date: [ ]  Phone calls or visits:  [ ] Week 1-2  Months   [ ] 1   [ ] 3   [ ] 6   [ ] 12  \par  -Yearly Reassessment    [ ] No Changes of Smoking [ ] Change of Smoking Habit (Non-Smoker to Smoker/ Smoker to Non Smoker)\par  (If there is change from Non Smoker to Smoker, please fill out new BRIEF TOBACCO CESSATION INTERVENTION FORM) \par  Date of Yearly Reassessment : [ ]\par  Comments:  [ ]\par  \par

## 2024-02-20 NOTE — SURGICAL HISTORY
[FreeTextEntry1] : The patient reports that he has had bunion surgery between [id number]. Patient reports having chronic foot pain.

## 2024-02-20 NOTE — HISTORY OF PRESENT ILLNESS
[FreeTextEntry1] : 2/20 Pt reports he is unsure if Prozac 50mg is helpful for his anxiety and depression. PT reports his vivid dreams and nightmares have resumed, occuring 3 to 4x in the past week.   Pt goes to bed by 9pm but does not fall asleep until 1am. Pt admits to using his phone. Pt uses Trazodone 100mg 4 to 5x in the week. Pt states he feels a little more well rested on Trazodone. Pt reports he has cut down to 3 to 4 cups of coffee. Pt continues to report low, depressed mood and has not noticed any significant improvement with recent increase in Prozac so far. Pt reports benefit from therapy sessions. Pt reports he continues to struggle with ADL's such as showering. Pt is trying to set up a daily schedule.  Pt denies any active or passive SI/HI/AVH at this time. Pt reports some instance of road rage when driving short distances this past month. Pt states he has not used his sleep apnea machine in years as it did not fit and pt did not follow up with PCP as they passed away. Pt was encouraged to follow up with medical doctor.  [FreeTextEntry2] : see above [FreeTextEntry3] : none

## 2024-02-20 NOTE — REASON FOR VISIT
[Telehealth (audio & video) - Individual/Group] : This visit was provided via telehealth using real-time 2-way audio visual technology. [Home] : The patient, [unfilled], was located at home, [unfilled], at the time of the visit. [Verbal consent obtained from patient/other participant(s)] : Verbal consent for telehealth/telephonic services obtained from patient/other participant(s) [OK  to leave message] : OK  to leave message [Community Based Organization] : Community Based Organization [Non-[facility] Health Provider/Facility] : Non-[facility] Health Provider/Facility [Patient] : Patient [Collateral - Name/Contact Info/Relationship:___] : Collateral: [unfilled] [FreeTextEntry5] : Cantonese  [FreeTextEntry6] : Maxime  [FreeTextEntry7] : he/him/his [FreeTextEntry2] : evaluation for anxiety,depression and PTSD related to 9/11 exposure  [FreeTextEntry1] : "Since I was certified I have PTSD. I have nightmares sometimes. I have more anxiety" \par

## 2024-02-20 NOTE — RISK ASSESSMENT
[No, patient denies ideation or behavior] : No, patient denies ideation or behavior [Clinical Interview] : Clinical Interview [No] : No [Mood disorder] : mood disorder [PTSD] : PTSD [Identifies reasons for living] : identifies reasons for living [Supportive social network of family or friends] : supportive social network of family or friends [Pentecostal beliefs] : Pentecostalism beliefs [Cultural, spiritual and/or moral attitudes against suicide] : cultural, spiritual and/or moral attitudes against suicide [Positive therapeutic relationships] : positive therapeutic relationships [None in the patient's lifetime] : None in the patient's lifetime [None Known] : none known [No known risk factors] : No known risk factors [Residential stability] : residential stability [Employment stability] : employment stability [Sobriety] : sobriety [Yes] : yes [de-identified] : the patient reports that he owns firearms that is stored away safely

## 2024-02-20 NOTE — FAMILY HISTORY
[FreeTextEntry1] : Family composition: wife, mom, aunt and one cousin in [place] only good relationship with mom. Father passed away in 2008. \par  Family history and background: Patient identifies as Chinese American\par  Family relationship: Patient reports that he has a good relationship with is family\par  Pertinent Family Medical, MH and Substance Use History including Adult Child of Alcoholic and child of substance abuse status; history of cancer and heart disease: Patient reports having hypertension, no history of substance use, the patient denied family history of substance use, breast cancer on mothers side and thyroid cancer.\par

## 2024-02-20 NOTE — PHYSICAL EXAM
[None] : none [Average] : average [Intermittent] : intermittent [Slowed] : slowed [Cooperative] : cooperative [Depressed] : depressed [Anxious] : anxious [Constricted] : constricted [Clear] : clear [Linear/Goal Directed] : linear/goal directed [WNL] : within normal limits [None Reported] : none reported [Mild] : mild [Moderate] : moderate [FreeTextEntry8] : less depressed [de-identified] : poor

## 2024-02-20 NOTE — DISCUSSION/SUMMARY
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 Pt denies any mental health conditions prior to 9/11), no prior IPP, no SA, never on meds, c/o feeling depressed, having anhedonia, apathy, agoraphobia, flashbacks, hypervigilance, etc. Pt was Dxd with PTSD by 9/11 fund and referred to us for treatment. Denies MELINA.  2/20 Will increase Prozac to aid with anxiety/depression and PTSD. Will also increase Trazodone to aid with initial and middle insomnia.  1. Next appt in 3 weeks 2. increase Prozac  to 60mg qdaily 3. Advised pt to follow up with medical doctor regarding sleep apnea and re-fitted for CPAP machine 4. increase Trazodone to 150mg qhs PRN for insomnia 5. Advised pt to cut down on caffeine use as well as limit screen time to improve sleep hygiene

## 2024-02-21 DIAGNOSIS — F43.10 POST-TRAUMATIC STRESS DISORDER, UNSPECIFIED: ICD-10-CM

## 2024-02-22 ENCOUNTER — OUTPATIENT (OUTPATIENT)
Dept: OUTPATIENT SERVICES | Facility: HOSPITAL | Age: 49
LOS: 1 days | End: 2024-02-22
Payer: COMMERCIAL

## 2024-02-22 ENCOUNTER — APPOINTMENT (OUTPATIENT)
Dept: PSYCHIATRY | Facility: CLINIC | Age: 49
End: 2024-02-22

## 2024-02-22 DIAGNOSIS — F43.10 POST-TRAUMATIC STRESS DISORDER, UNSPECIFIED: ICD-10-CM

## 2024-02-22 PROCEDURE — 90832 PSYTX W PT 30 MINUTES: CPT | Mod: 95

## 2024-02-22 NOTE — END OF VISIT
[Individual Psychotherapy for 16-37 minutes] : Individual Psychotherapy for 16-37 minutes [Duration of Psychotherapy Visit (minutes spent in synchronous communication): ____] : Duration of Psychotherapy Visit (minutes spent in synchronous communication): [unfilled] [Licensed Clinician] : Licensed Clinician

## 2024-02-22 NOTE — REASON FOR VISIT
[Patient preference] : as per patient preference [Continuing, patient not seen in-person within last 12 months (provide details below)] : Telehealth services are continuing, patient not seen in-person within last 12 months.  [Telehealth (audio & video) - Individual/Group] : This visit was provided via telehealth using real-time 2-way audio visual technology. [Other Location: e.g. Home (Enter Location, City,State)___] : The provider was located at [unfilled]. [Home] : The patient, [unfilled], was located at home, [unfilled], at the time of the visit. [Verbal consent obtained from patient/other participant(s)] : Verbal consent for telehealth/telephonic services obtained from patient/other participant(s) [FreeTextEntry5] : 12:16pm  [FreeTextEntry4] : 12pm  [FreeTextEntry3] : the patient requested telehealth sessions and is appropriate for telehealth services  [FreeTextEntry1] : psychotherapy follow up  [Patient] : Patient

## 2024-02-22 NOTE — PLAN
[FreeTextEntry2] : Goal #1- Management of depression  Obj #1- The patient will identify three triggers for his depression and will verbalize and utilize three effective coping mechanisms  Obj #2-  The patient will attend medication management appointments and will take his psychotropic medications as prescribed   Goal #2- Management of anxiety  Obj #2-  The patient will verbalize three triggers for anxiety and will verbalize and utilize at least three effective coping mechanisms for anxiety [Cognitive and/or Behavior Therapy] : Cognitive and/or Behavior Therapy  [Skills training (all types)] : Skills training (all types)  [Supportive Therapy] : Supportive Therapy [de-identified] : The patient attended his scheduled session with the writer via [email or web address]. The patient noted that his anxiety and depression remain the same however, he does not feel worse since his last session. The patient noted that his wife has been home  as she has medical appointments and he has  been enjoying this time. Patient explained that he was able to get a task that needed to be completed accomplished. The writer commended the patient. Patient explained that he has been trying to keep himself busy with tasks around the home. The writer inquired more about scheduling a follow up appointment regarding his sleep apnea and he noted that he will need to follow up with [facility] regarding this. Psychoeducation was provided to the patient regarding sleep apnea and the importance of following up and medical advocacy for himself. The patient reports medication adherence. Ongoing self care is encouraged with the patient. The writer will continue to provide the patient with support and encouragement where needed. The patient is scheduled for a follow up appointment on 2/29/2024 at 12pm via telehealth per the patient's request. [Recommended Frequency of Visits: ____] : Recommended frequency of visits: [unfilled] [Return in ____ week(s)] : Return in [unfilled] week(s)

## 2024-02-22 NOTE — RISK ASSESSMENT
[Low acute suicide risk] : Low acute suicide risk [No, patient denies ideation or behavior] : No, patient denies ideation or behavior [Not clinically indicated] : Safety Plan completed/updated (for individuals at risk): Not clinically indicated [No] : No

## 2024-02-23 DIAGNOSIS — F43.10 POST-TRAUMATIC STRESS DISORDER, UNSPECIFIED: ICD-10-CM

## 2024-02-29 ENCOUNTER — APPOINTMENT (OUTPATIENT)
Dept: PSYCHIATRY | Facility: CLINIC | Age: 49
End: 2024-02-29

## 2024-02-29 ENCOUNTER — OUTPATIENT (OUTPATIENT)
Dept: OUTPATIENT SERVICES | Facility: HOSPITAL | Age: 49
LOS: 1 days | End: 2024-02-29
Payer: COMMERCIAL

## 2024-02-29 DIAGNOSIS — F43.10 POST-TRAUMATIC STRESS DISORDER, UNSPECIFIED: ICD-10-CM

## 2024-02-29 PROCEDURE — 90832 PSYTX W PT 30 MINUTES: CPT

## 2024-02-29 NOTE — PLAN
[Cognitive and/or Behavior Therapy] : Cognitive and/or Behavior Therapy  [Supportive Therapy] : Supportive Therapy [Skills training (all types)] : Skills training (all types)  [FreeTextEntry2] : Goal #1- Management of depression  Obj #1- The patient will identify three triggers for his depression and will verbalize and utilize three effective coping mechanisms  Obj #2-  The patient will attend medication management appointments and will take his psychotropic medications as prescribed   Goal #2- Management of anxiety  Obj #2-  The patient will verbalize three triggers for anxiety and will verbalize and utilize at least three effective coping mechanisms for anxiety [de-identified] : The patient attended his scheduled session with the writer via [email or web address]. The patient reported that he continues to have periods where he has nightmares however, he reports improved sleep and slightly improved anxiety and depression. The patient explained that he has had periods of anger. The writer explored this with the patient further and he noted that his anger is surrounding trying to sort his paperwork for short term disability and FMLA leave. The patient explained that he has been getting phone calls from his job that they would like to come and revoke his badge and shield. Patient expressed feelings of harassment as this has been an ongoing issue. Patient explained that he plans to resubmit his paperwork and speak with his . The patient noted that he has been trying to keep himself busy throughout the day. The patient reports medication adherence. The writer will continue to provide the patient with support and encouragement where needed. The patient is scheduled for a follow up appointment on 3/7/2024 at 11:30am via telehealth per the patient's request.  [Recommended Frequency of Visits: ____] : Recommended frequency of visits: [unfilled] [Return in ____ week(s)] : Return in [unfilled] week(s)

## 2024-02-29 NOTE — REASON FOR VISIT
[Patient preference] : as per patient preference [Continuing, patient not seen in-person within last 12 months (provide details below)] : Telehealth services are continuing, patient not seen in-person within last 12 months.  [Telehealth (audio & video) - Individual/Group] : This visit was provided via telehealth using real-time 2-way audio visual technology. [Medical Office: ([facility])___] : The provider was located at the medical office in [unfilled]. [Home] : The patient, [unfilled], was located at home, [unfilled], at the time of the visit. [Verbal consent obtained from patient/other participant(s)] : Verbal consent for telehealth/telephonic services obtained from patient/other participant(s) [Patient] : Patient [FreeTextEntry4] : 12PM  [FreeTextEntry5] : 12:30PM  [FreeTextEntry3] : the patient requested telehealth sessions and is appropriate for telehealth services  [FreeTextEntry1] : psychotherapy follow up

## 2024-03-01 DIAGNOSIS — F43.10 POST-TRAUMATIC STRESS DISORDER, UNSPECIFIED: ICD-10-CM

## 2024-03-07 ENCOUNTER — APPOINTMENT (OUTPATIENT)
Dept: PSYCHIATRY | Facility: CLINIC | Age: 49
End: 2024-03-07

## 2024-03-07 ENCOUNTER — OUTPATIENT (OUTPATIENT)
Dept: OUTPATIENT SERVICES | Facility: HOSPITAL | Age: 49
LOS: 1 days | End: 2024-03-07
Payer: COMMERCIAL

## 2024-03-07 DIAGNOSIS — F43.10 POST-TRAUMATIC STRESS DISORDER, UNSPECIFIED: ICD-10-CM

## 2024-03-07 PROCEDURE — 90832 PSYTX W PT 30 MINUTES: CPT | Mod: 95

## 2024-03-07 NOTE — PHYSICAL EXAM
[Individual reports tobacco use during the last 30 days?] : Individual reports tobacco use during the last 30 days? Yes [Individual reports use of the following tobacco products during the last 30 days?] : Individual reports use of the following tobacco products during the last 30 days? Yes -  [Cigarettes] : Cigarettes [Individual reports current use of tobacco cessation medication or nicotine replacement therapy?] : Individual reports current use of tobacco cessation medication or nicotine replacement therapy? Yes [Was tobacco cessation medication and/or nicotine replacement therapy recommended?] : Was tobacco cessation medication and/or nicotine replacement therapy recommended? Yes [Does individual accept referral to MD for cessation medication or NRT?] : Does individual accept referral to MD for cessation medication or NRT? Yes

## 2024-03-07 NOTE — REASON FOR VISIT
[Patient preference] : as per patient preference [Continuing, patient not seen in-person within last 12 months (provide details below)] : Telehealth services are continuing, patient not seen in-person within last 12 months.  [Telehealth (audio & video) - Individual/Group] : This visit was provided via telehealth using real-time 2-way audio visual technology. [Other Location: e.g. Home (Enter Location, City,State)___] : The provider was located at [unfilled]. [Home] : The patient, [unfilled], was located at home, [unfilled], at the time of the visit. [Verbal consent obtained from patient/other participant(s)] : Verbal consent for telehealth/telephonic services obtained from patient/other participant(s) [Patient] : Patient [FreeTextEntry5] : 11:50am  [FreeTextEntry4] : 11:30am  [FreeTextEntry3] : the patient requested telehealth sessions and is appropriate for telehealth services  [FreeTextEntry1] : psychotherapy follow up

## 2024-03-07 NOTE — REASON FOR VISIT
[Patient preference] : as per patient preference [Telehealth (audio & video) - Individual/Group] : This visit was provided via telehealth using real-time 2-way audio visual technology. [Continuing, patient not seen in-person within last 12 months (provide details below)] : Telehealth services are continuing, patient not seen in-person within last 12 months.  [Home] : The patient, [unfilled], was located at home, [unfilled], at the time of the visit. [Other Location: e.g. Home (Enter Location, City,State)___] : The provider was located at [unfilled]. [Verbal consent obtained from patient/other participant(s)] : Verbal consent for telehealth/telephonic services obtained from patient/other participant(s) [Patient] : Patient [FreeTextEntry4] : 11:30am  [FreeTextEntry5] : 11:50am  [FreeTextEntry3] : the patient requested telehealth sessions and is appropriate for telehealth services  [FreeTextEntry1] : psychotherapy follow up

## 2024-03-07 NOTE — PLAN
[Cognitive and/or Behavior Therapy] : Cognitive and/or Behavior Therapy  [Skills training (all types)] : Skills training (all types)  [Supportive Therapy] : Supportive Therapy [FreeTextEntry2] : Goal #1- Management of depression  Obj #1- The patient will identify three triggers for his depression and will verbalize and utilize three effective coping mechanisms  Obj #2-  The patient will attend medication management appointments and will take his psychotropic medications as prescribed   Goal #2- Management of anxiety  Obj #2-  The patient will verbalize three triggers for anxiety and will verbalize and utilize at least three effective coping mechanisms for anxiety [Recommended Frequency of Visits: ____] : Recommended frequency of visits: [unfilled] [de-identified] : The patient attended his scheduled session with the writer via [employer]. The patient reported that he has been feeling "worse" since last session. The writer explored this with the patient further and he noted that he has been feeling depressed and anxious. After further discussions, the patient explained that the thought of going to work triggers him due to trauma that he has witnessed on the job. The patient explained that he has a complicated relationship with the supervisors at his job as they  typically communicate with him via yelling and cursing. The patient reported that when he was at work, he would often sit in his vehicle for as long as possible as he wanted to avoid  having to go into work. The writer actively listened to the patient, validated his feelings and support was rendered. The writer assisted the patient with weighing out the pros and cons of  returning to work and what this  could potentially look like. The patient explained that he plans on discussing with his wife how uncomfortable he feels at his wok place to come up with a possible plan. The patient reports medication adherence. The writer will continue to provide the patient with support and encouragement where needed. The patient is scheduled for a follow up appointment on 3/14/2024 at 11:30am via telehealth per the patient's request.    [Return in ____ week(s)] : Return in [unfilled] week(s)

## 2024-03-07 NOTE — PLAN
[Cognitive and/or Behavior Therapy] : Cognitive and/or Behavior Therapy  [Skills training (all types)] : Skills training (all types)  [Supportive Therapy] : Supportive Therapy [FreeTextEntry2] : Goal #1- Management of depression  Obj #1- The patient will identify three triggers for his depression and will verbalize and utilize three effective coping mechanisms  Obj #2-  The patient will attend medication management appointments and will take his psychotropic medications as prescribed   Goal #2- Management of anxiety  Obj #2-  The patient will verbalize three triggers for anxiety and will verbalize and utilize at least three effective coping mechanisms for anxiety [de-identified] : The patient attended his scheduled session with the writer via [employer]. The patient reported that he has been feeling "worse" since last session. The writer explored this with the patient further and he noted that he has been feeling depressed and anxious. After further discussions, the patient explained that the thought of going to work triggers him due to trauma that he has witnessed on the job. The patient explained that he has a complicated relationship with the supervisors at his job as they  typically communicate with him via yelling and cursing. The patient reported that when he was at work, he would often sit in his vehicle for as long as possible as he wanted to avoid  having to go into work. The writer actively listened to the patient, validated his feelings and support was rendered. The writer assisted the patient with weighing out the pros and cons of  returning to work and what this  could potentially look like. The patient explained that he plans on discussing with his wife how uncomfortable he feels at his wok place to come up with a possible plan. The patient reports medication adherence. The writer will continue to provide the patient with support and encouragement where needed. The patient is scheduled for a follow up appointment on 3/14/2024 at 11:30am via telehealth per the patient's request.    [Recommended Frequency of Visits: ____] : Recommended frequency of visits: [unfilled] [Return in ____ week(s)] : Return in [unfilled] week(s)

## 2024-03-08 DIAGNOSIS — F43.10 POST-TRAUMATIC STRESS DISORDER, UNSPECIFIED: ICD-10-CM

## 2024-03-12 ENCOUNTER — APPOINTMENT (OUTPATIENT)
Dept: PSYCHIATRY | Facility: CLINIC | Age: 49
End: 2024-03-12
Payer: COMMERCIAL

## 2024-03-12 ENCOUNTER — OUTPATIENT (OUTPATIENT)
Dept: OUTPATIENT SERVICES | Facility: HOSPITAL | Age: 49
LOS: 1 days | End: 2024-03-12
Payer: COMMERCIAL

## 2024-03-12 DIAGNOSIS — F43.10 POST-TRAUMATIC STRESS DISORDER, UNSPECIFIED: ICD-10-CM

## 2024-03-12 PROCEDURE — 99213 OFFICE O/P EST LOW 20 MIN: CPT | Mod: 95

## 2024-03-12 NOTE — HISTORY OF PRESENT ILLNESS
[FreeTextEntry1] : 3/12 PT reports his sleep has improved since increasing Trazodone. Pt states he now falls asleep by midnight after getting into bed by 11pm. Pt reports his nightmares/vivid dreams have improved slightly this month as well. Pt states the nightmares occur only twice a week. Pt reports getting 6 to 8 hours of sleep.  Pt reports he will also nap during the day for 1 hour around 1 or 2pm. Pt states he has cut down on his coffee intake, now drinks 1 cup of coffee in the morning and 1 cup of decaf in the afternoon. Pt states his anxiety/depression has remained the same despite going up on Prozac. Pt states he struggles to remember things. Pt states he feels easily irritated especially when people call him as he does not want people to bother him. Pt requests letter from provider to provide to HR to continue medical benefits. Pt has not yet followed up with PCP regarding sleep apnea/CPAP refitting.    [FreeTextEntry3] : none [FreeTextEntry2] : see above

## 2024-03-12 NOTE — PHYSICAL EXAM
[None] : none [Average] : average [Intermittent] : intermittent [Slowed] : slowed [Cooperative] : cooperative [Depressed] : depressed [Anxious] : anxious [Constricted] : constricted [Clear] : clear [Linear/Goal Directed] : linear/goal directed [Mild] : mild [WNL] : within normal limits [None Reported] : none reported [Moderate] : moderate [FreeTextEntry8] : less depressed [de-identified] : poor

## 2024-03-12 NOTE — SOCIAL HISTORY
[FreeTextEntry1] : Legal Status \par  Does individual Served have a Legal Guardian, rep Payee or Conservatorship?\par  [ X] No\par  [ ] Yes - Details: [ ] \par  \par  Legal Involvement history \par  Does the individual have a history of or current involvement with the legal system? \par  [ X] No\par  [ ] Yes - Details: [ ] \par  \par   Services \par  Have you ever served in the ? \par  [X ] No\par  [ ] Yes - Details: [ ] \par  \par  Employment history: Patient is currently a . Patient is a member of the [employer] department for over 20 years.\par  \par  Developmental history: N/A \par  \par  Sexual hx/identity Sexual History/ Concern (include sexual orientation and other relevant information): Heterosexual cisgender male \par  \par  Race - ethnicity - culture information:  American born in [place] and [place]. Patient reports that he is a naturalized citizen. \par  \par  \par  Social supports (friends, Volunteers, club, AA meeting, other meetings ) ? Patient reports that his wife, Joyce is his only social support. \par  \par  Meaningful Activities: video games\par  \par  \par  Spiritual Assessment Tool - FICA\par  F. What is your carmen or belief? Uatsdin\par  Do you consider yourself spiritual or Cheondoism? No \par  Is there something you believe in that gives meaning to your life? Joyce my wife \par  I: Is it important in your life? Yes \par  What influence does it have on how you take care of yourself? "I want to be a better person overall" \par  How have your beliefs influenced your behavior during this illness? What role do your beliefs play in regaining your health?  "I follow the teachings of Buddha" \par  C. Are you part of a spiritual or Cheondoism community? No \par  Is this of support to you and how? N/A \par  Is there a person or group of people you really love or who are really important to you? Joyce , mom and aunt \par  H. We have been discussing your belief and supports. What else gives you internal support? No\par  What are your sources of hope, strength, comfort and peace? "being home in my safe place" \par  What do you hold on to during difficult times? No \par  what sustains you and keeps you going? My wife \par  A. How would you like me, your healthcare provider, to address these issues in your healthcare? Talk about it \par  \par  SMOKING CESSATION \par  Do you Smoke?                              [ X] Yes		[ ] No\par  Do you want to quit? 		[ ] Yes		[X ] No\par  -ASK\par  	Number of cigarettes   [ ] cigars [ ]  pipe bowls [20 ]  per day \par  	Number of ST cans/ pouches per week [ 7]\par  	Number of years used [ over 30 years ]\par  	How soon after you wake up do you use tobacco?  [X ] within 30 minutes      [ ] more than 30 minutes \par  	Previous quit attempts:  # of attempts [ ] longest quit period [ ] methods(s) used [ ]- Never tried to quit smoking\par  How long ago was last attempt to quit  [ ] years [ ] months- NA \par  	Reasons for wanting to quit[ ] Patient reports that he does not want to quit smoking \par  -ADVISE   about the oral benefits of quitting\par  -ASSESS   willingness to make a quit attempt (Stage of Change) \par  	    [ ] Precontemplation (Stop here & Reassess next visit)	[ ] Contemplation [ ] Preparation  \par  -ASSIST    (depending on stage of change) \par  	Self-Help Pamphlets & Materials\par  	List of local community group/individual quit programs and phone help lines\par  	Encourage a quit date (for those who are ready)\par  	Pharmacotherapy: Nicotine gum/ Lozenge/ Patch/ Inhaler/NS/Zyban/ Chantix\par   	RX: 	[ ]  () \par  -ARRANGE  Follow up if set a quit date (with permission)\par  Quit Date: [ ]  Phone calls or visits:  [ ] Week 1-2  Months   [ ] 1   [ ] 3   [ ] 6   [ ] 12  \par  -Yearly Reassessment    [ ] No Changes of Smoking [ ] Change of Smoking Habit (Non-Smoker to Smoker/ Smoker to Non Smoker)\par  (If there is change from Non Smoker to Smoker, please fill out new BRIEF TOBACCO CESSATION INTERVENTION FORM) \par  Date of Yearly Reassessment : [ ]\par  Comments:  [ ]\par  \par

## 2024-03-12 NOTE — FAMILY HISTORY
[FreeTextEntry1] : Family composition: wife, mom, aunt and one cousin in [place] only good relationship with mom. Father passed away in 2008. \par  Family history and background: Patient identifies as Chinese American\par  Family relationship: Patient reports that he has a good relationship with is family\par  Pertinent Family Medical, MH and Substance Use History including Adult Child of Alcoholic and child of substance abuse status; history of cancer and heart disease: Patient reports having hypertension, no history of substance use, the patient denied family history of substance use, breast cancer on mothers side and thyroid cancer.\par

## 2024-03-12 NOTE — DISCUSSION/SUMMARY
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 Pt denies any mental health conditions prior to 9/11), no prior IPP, no SA, never on meds, c/o feeling depressed, having anhedonia, apathy, agoraphobia, flashbacks, hypervigilance, etc. Pt was Dxd with PTSD by 9/11 fund and referred to us for treatment. Denies MELINA.  3/12 Pt reports improved sleep since increasing Trazodone. Will continue current med regimen.   1. Next appt in 3 weeks 2. continue Prozac 60mg qdaily 3. Advised pt to follow up with medical doctor regarding sleep apnea and re-fitted for CPAP machine 4. continue Trazodone 150mg qhs PRN for insomnia 5. Pt has reduced caffeine intake

## 2024-03-12 NOTE — RISK ASSESSMENT
[Clinical Interview] : Clinical Interview [No, patient denies ideation or behavior] : No, patient denies ideation or behavior [No] : No [Identifies reasons for living] : identifies reasons for living [PTSD] : PTSD [Mood disorder] : mood disorder [Supportive social network of family or friends] : supportive social network of family or friends [Hindu beliefs] : Protestant beliefs [Cultural, spiritual and/or moral attitudes against suicide] : cultural, spiritual and/or moral attitudes against suicide [Positive therapeutic relationships] : positive therapeutic relationships [None in the patient's lifetime] : None in the patient's lifetime [No known risk factors] : No known risk factors [None Known] : none known [Employment stability] : employment stability [Residential stability] : residential stability [Yes] : yes [Sobriety] : sobriety [de-identified] : the patient reports that he owns firearms that is stored away safely

## 2024-03-12 NOTE — SURGICAL HISTORY
[FreeTextEntry1] : The patient reports that he has had bunion surgery between [id number]. Patient reports having chronic foot pain.

## 2024-03-12 NOTE — REASON FOR VISIT
[Telehealth (audio & video) - Individual/Group] : This visit was provided via telehealth using real-time 2-way audio visual technology. [Home] : The patient, [unfilled], was located at home, [unfilled], at the time of the visit. [Verbal consent obtained from patient/other participant(s)] : Verbal consent for telehealth/telephonic services obtained from patient/other participant(s) [OK  to leave message] : OK  to leave message [Community Based Organization] : Community Based Organization [Patient] : Patient [Non-[facility] Health Provider/Facility] : Non-[facility] Health Provider/Facility [Collateral - Name/Contact Info/Relationship:___] : Collateral: [unfilled] [FreeTextEntry6] : Maxime  [FreeTextEntry5] : Cantonese  [FreeTextEntry7] : he/him/his [FreeTextEntry2] : evaluation for anxiety,depression and PTSD related to 9/11 exposure  [FreeTextEntry1] : "Since I was certified I have PTSD. I have nightmares sometimes. I have more anxiety" \par

## 2024-03-13 DIAGNOSIS — F43.10 POST-TRAUMATIC STRESS DISORDER, UNSPECIFIED: ICD-10-CM

## 2024-03-14 ENCOUNTER — OUTPATIENT (OUTPATIENT)
Dept: OUTPATIENT SERVICES | Facility: HOSPITAL | Age: 49
LOS: 1 days | End: 2024-03-14
Payer: COMMERCIAL

## 2024-03-14 ENCOUNTER — APPOINTMENT (OUTPATIENT)
Dept: PSYCHIATRY | Facility: CLINIC | Age: 49
End: 2024-03-14

## 2024-03-14 DIAGNOSIS — F43.10 POST-TRAUMATIC STRESS DISORDER, UNSPECIFIED: ICD-10-CM

## 2024-03-14 PROCEDURE — 90832 PSYTX W PT 30 MINUTES: CPT | Mod: 95

## 2024-03-14 NOTE — REASON FOR VISIT
[Patient preference] : as per patient preference [Continuing, patient not seen in-person within last 12 months (provide details below)] : Telehealth services are continuing, patient not seen in-person within last 12 months.  [Other Location: e.g. Home (Enter Location, City,State)___] : The provider was located at [unfilled]. [Telehealth (audio & video) - Individual/Group] : This visit was provided via telehealth using real-time 2-way audio visual technology. [Home] : The patient, [unfilled], was located at home, [unfilled], at the time of the visit. [Verbal consent obtained from patient/other participant(s)] : Verbal consent for telehealth/telephonic services obtained from patient/other participant(s) [FreeTextEntry5] : 11:51am  [FreeTextEntry4] : 11:30am  [FreeTextEntry3] : the patient requested telehealth sessions and is appropriate for telehealth services  [Patient] : Patient [FreeTextEntry1] : psychotherapy follow up

## 2024-03-14 NOTE — PLAN
[FreeTextEntry2] : Goal #1- Management of depression  Obj #1- The patient will identify three triggers for his depression and will verbalize and utilize three effective coping mechanisms  Obj #2-  The patient will attend medication management appointments and will take his psychotropic medications as prescribed   Goal #2- Management of anxiety  Obj #2-  The patient will verbalize three triggers for anxiety and will verbalize and utilize at least three effective coping mechanisms for anxiety [Cognitive and/or Behavior Therapy] : Cognitive and/or Behavior Therapy  [Skills training (all types)] : Skills training (all types)  [Supportive Therapy] : Supportive Therapy [de-identified] : The patient attended his scheduled session with the writer via SmartMenuCard. The patient explained that he has been feeling increasingly "worse" with ongoing anxiety and depression. 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 The patient expressed how difficult it was for him to get the news and he is grieving the loss of this individual. Patient noted that the [employer] came to his home and revoked his badge and shield and also cleared out his locker. The patient and the writer discussed both of these incidents and the sense of loss that it had created. Patient explained that he has been giving some thought to attending the services however, he does not feel like interacting with anyone.  The patient noted that while he is sleeping more, he continues to feel tired. Patient has not followed up regarding his sleep apnea and potentially being reevaluated. The patient and the writer discussed ongoing behavioral activation and the importance of getting out of the home. The patient explained that h has feelings of  loneliness as his wife continues to care for her ailing grandmother. The writer actively listened to the patient, validated her feelings and support was rendered. The patient reports medication adherence. The writer will continue to provide the patient with support and encouragement where needed. The patient is scheduled for a follow up appointment on 3/21/2024 at 11:30am via telehealth per the patient's request.  [Recommended Frequency of Visits: ____] : Recommended frequency of visits: [unfilled] [Return in ____ week(s)] : Return in [unfilled] week(s)

## 2024-03-15 DIAGNOSIS — F43.10 POST-TRAUMATIC STRESS DISORDER, UNSPECIFIED: ICD-10-CM

## 2024-03-21 ENCOUNTER — APPOINTMENT (OUTPATIENT)
Dept: PSYCHIATRY | Facility: CLINIC | Age: 49
End: 2024-03-21

## 2024-03-21 ENCOUNTER — OUTPATIENT (OUTPATIENT)
Dept: OUTPATIENT SERVICES | Facility: HOSPITAL | Age: 49
LOS: 1 days | End: 2024-03-21
Payer: COMMERCIAL

## 2024-03-21 DIAGNOSIS — F43.10 POST-TRAUMATIC STRESS DISORDER, UNSPECIFIED: ICD-10-CM

## 2024-03-21 PROCEDURE — 90832 PSYTX W PT 30 MINUTES: CPT | Mod: 95

## 2024-03-21 NOTE — REASON FOR VISIT
[Patient preference] : as per patient preference [Continuing, patient not seen in-person within last 12 months (provide details below)] : Telehealth services are continuing, patient not seen in-person within last 12 months.  [Other Location: e.g. Home (Enter Location, City,State)___] : The provider was located at [unfilled]. [Telehealth (audio & video) - Individual/Group] : This visit was provided via telehealth using real-time 2-way audio visual technology. [Home] : The patient, [unfilled], was located at home, [unfilled], at the time of the visit. [Verbal consent obtained from patient/other participant(s)] : Verbal consent for telehealth/telephonic services obtained from patient/other participant(s) [FreeTextEntry4] : 11:30am  [FreeTextEntry5] : 11:51am  [FreeTextEntry3] : the patient requested telehealth sessions and is appropriate for telehealth services  [FreeTextEntry1] : psychotherapy follow up  [Patient] : Patient

## 2024-03-21 NOTE — PLAN
[FreeTextEntry2] : Goal #1- Management of depression  Obj #1- The patient will identify three triggers for his depression and will verbalize and utilize three effective coping mechanisms  Obj #2-  The patient will attend medication management appointments and will take his psychotropic medications as prescribed   Goal #2- Management of anxiety  Obj #2-  The patient will verbalize three triggers for anxiety and will verbalize and utilize at least three effective coping mechanisms for anxiety [Cognitive and/or Behavior Therapy] : Cognitive and/or Behavior Therapy  [Supportive Therapy] : Supportive Therapy [Skills training (all types)] : Skills training (all types)  [de-identified] : The patient attended his scheduled session with the writer via Plan[employer]ax. The patient reports ongoing anxiety and depression. The patient noted that not much has changed regarding his mental health. The writer explored this with the patient further and he noted that he continues to grieve the passing of his sergeant. Patient explained that he decided that he would not attend his sergeant's . Patient noted that since then, he has been feeling guilty for not attending the services and he has been coping with feelings surrounding that. The writer actively listened to the patient, validated his feelings and support was rendered. The patient and the writer discussed the patient inquiring about  where his sergeant is buried for him to grieve in private. The patient noted that he continues to be more irritable and noted that he has since stopped driving his vehicle due to past instances of road rage. The patient and the writer discussed distress tolerance. The patient reports medication adherence. The writer will continue to provide the patient with support and encouragement where needed. The patient is scheduled for a follow up appointment on 3/28/2024 at 11:30am via telehealth per the patient's request.  [Recommended Frequency of Visits: ____] : Recommended frequency of visits: [unfilled] [Return in ____ week(s)] : Return in [unfilled] week(s)

## 2024-03-22 DIAGNOSIS — F43.10 POST-TRAUMATIC STRESS DISORDER, UNSPECIFIED: ICD-10-CM

## 2024-03-25 NOTE — DISCUSSION/SUMMARY
[Plan Review] : Plan Review [Able to manage surrounding demands and opportunities] : able to manage surrounding demands and opportunities [Able to exercise self-direction] : able to exercise self-direction [Able to set and pursue goals] : able to set and pursue goals [Adherent to treatment recommendations] : adherent to treatment recommendations [Articulate] : articulate [Attempting to realize their potential] : Attempting to realize their potential [Cognitively intact] : cognitively intact [Insightful] : insightful [Creative] : creative [Intelligent] : intelligent [Motivated and ready for change] : motivated and ready for change [Motivated to participate in treatment] : motivated to participate in treatment [Part of a supportive marriage] : part of a supportive marriage [Financially stable] : financially stable [Part of a supportive family] : part of a supportive family [Steady employment] : steady employment [Housing stability] : housing stability [English fluency] : English fluency [Connected to healthcare] : connected to healthcare [Access to safe outdoor spaces] : access to safe outdoor spaces [Social supports] : social supports [Continued - Progress made] : Continued - Progress made: [Mental Health] : Mental Health [FreeTextEntry2] : 3/7/2025 [FreeTextEntry9] : The patient identifies as a Oriental orthodox  [de-identified] : The writer will collaborate with the patient's psychiatrist as needed   [Physical Health] : Physical Health [Initial] : Initial [every ___ months] : every [unfilled] months [every ___ weeks] : every [unfilled] weeks [Improvement in symptoms as evidenced by:] : Improvement in symptoms as evidenced by: [Attainment of higher level of functioning as evidenced by:] : Attainment of higher level of functioning as evidenced by: [No - Explain] : No [None - Reason others did not participate:] : None - Reason others did not participate:  [Yes] : Yes [Psychiatric Provider/Prescriber] : Psychiatric Provider/Prescriber [Therapist] : Therapist [Supervisor (if needed)] : Supervisor [de-identified] : The patient will attend scheduled medication management appointments and will adhere to psychotropic medications as prescribed  [de-identified] : 3/7/11048 [FreeTextEntry5] : The writer will utilize CBT and supportive therapy  [FreeTextEntry1] : Health maintenance (assessed long term)  [FreeTextEntry4] : "To feel less anxious and depressed"  [de-identified] : The patient will attend scheduled medical appointments and will adhere to medications along with attending scheduled [facility] health monitoring programs   [de-identified] : 3/7/2025  [de-identified] : The patient is seen by Dr. Lawler (virtual) [de-identified] : The patient is seen by Perlita Bolaños (hybrid)  [de-identified] : The patient expresses improvement in performing activities of daily living and/or says progress with initial complaint symptoms. In addition, the treatment team will conduct diagnose-based screenings as needed. [de-identified] : The patient expresses improvement in performing activities of daily living and/or says progress with initial complaint symptoms. In addition, the treatment team will conduct diagnose-based screenings as needed. [de-identified] : not clinically indicated  [Tobacco Screening Completed?] : Tobacco Screening Completed: Yes

## 2024-03-25 NOTE — DISCUSSION/SUMMARY
[Plan Review] : Plan Review [Able to manage surrounding demands and opportunities] : able to manage surrounding demands and opportunities [Able to exercise self-direction] : able to exercise self-direction [Able to set and pursue goals] : able to set and pursue goals [Adherent to treatment recommendations] : adherent to treatment recommendations [Articulate] : articulate [Attempting to realize their potential] : Attempting to realize their potential [Cognitively intact] : cognitively intact [Insightful] : insightful [Intelligent] : intelligent [Creative] : creative [Motivated to participate in treatment] : motivated to participate in treatment [Motivated and ready for change] : motivated and ready for change [Financially stable] : financially stable [Part of a supportive marriage] : part of a supportive marriage [Part of a supportive family] : part of a supportive family [Steady employment] : steady employment [English fluency] : English fluency [Housing stability] : housing stability [Connected to healthcare] : connected to healthcare [Access to safe outdoor spaces] : access to safe outdoor spaces [Social supports] : social supports [Continued - Progress made] : Continued - Progress made: [Mental Health] : Mental Health [FreeTextEntry2] : 3/7/2025 [FreeTextEntry9] : The patient identifies as a Mormonism  [de-identified] : The writer will collaborate with the patient's psychiatrist as needed   [Physical Health] : Physical Health [Initial] : Initial [every ___ months] : every [unfilled] months [every ___ weeks] : every [unfilled] weeks [Improvement in symptoms as evidenced by:] : Improvement in symptoms as evidenced by: [Attainment of higher level of functioning as evidenced by:] : Attainment of higher level of functioning as evidenced by: [No - Explain] : No [None - Reason others did not participate:] : None - Reason others did not participate:  [Yes] : Yes [Psychiatric Provider/Prescriber] : Psychiatric Provider/Prescriber [Therapist] : Therapist [Supervisor (if needed)] : Supervisor [de-identified] : The patient will attend scheduled medication management appointments and will adhere to psychotropic medications as prescribed  [de-identified] : 3/7/16115 [FreeTextEntry5] : The writer will utilize CBT and supportive therapy  [FreeTextEntry1] : Health maintenance (assessed long term)  [FreeTextEntry4] : "To feel less anxious and depressed"  [de-identified] : The patient will attend scheduled medical appointments and will adhere to medications along with attending scheduled [facility] health monitoring programs   [de-identified] : 3/7/2025  [de-identified] : The patient is seen by Dr. Lawler (virtual) [de-identified] : The patient is seen by Perlita Bolaños (hybrid)  [de-identified] : The patient expresses improvement in performing activities of daily living and/or says progress with initial complaint symptoms. In addition, the treatment team will conduct diagnose-based screenings as needed. [de-identified] : The patient expresses improvement in performing activities of daily living and/or says progress with initial complaint symptoms. In addition, the treatment team will conduct diagnose-based screenings as needed. [de-identified] : not clinically indicated  [Tobacco Screening Completed?] : Tobacco Screening Completed: Yes

## 2024-03-28 ENCOUNTER — APPOINTMENT (OUTPATIENT)
Dept: PSYCHIATRY | Facility: CLINIC | Age: 49
End: 2024-03-28

## 2024-03-28 ENCOUNTER — OUTPATIENT (OUTPATIENT)
Dept: OUTPATIENT SERVICES | Facility: HOSPITAL | Age: 49
LOS: 1 days | End: 2024-03-28
Payer: COMMERCIAL

## 2024-03-28 DIAGNOSIS — F43.10 POST-TRAUMATIC STRESS DISORDER, UNSPECIFIED: ICD-10-CM

## 2024-03-28 PROCEDURE — 90832 PSYTX W PT 30 MINUTES: CPT

## 2024-03-28 NOTE — REASON FOR VISIT
[Patient preference] : as per patient preference [Continuing, patient not seen in-person within last 12 months (provide details below)] : Telehealth services are continuing, patient not seen in-person within last 12 months.  [Telehealth (audio & video) - Individual/Group] : This visit was provided via telehealth using real-time 2-way audio visual technology. [Medical Office: ([facility])___] : The provider was located at the medical office in [unfilled]. [Verbal consent obtained from patient/other participant(s)] : Verbal consent for telehealth/telephonic services obtained from patient/other participant(s) [Home] : The patient, [unfilled], was located at home, [unfilled], at the time of the visit. [Patient] : Patient [FreeTextEntry4] : 11:30am  [FreeTextEntry5] : 11:52am  [FreeTextEntry3] : the patient requested telehealth sessions and is appropriate for telehealth services  [FreeTextEntry1] : psychotherapy follow up

## 2024-03-28 NOTE — PLAN
[Cognitive and/or Behavior Therapy] : Cognitive and/or Behavior Therapy  [Skills training (all types)] : Skills training (all types)  [Supportive Therapy] : Supportive Therapy [FreeTextEntry2] : Goal #1- Management of depression  Obj #1- The patient will identify three triggers for his depression and will verbalize and utilize three effective coping mechanisms  Obj #2-  The patient will attend medication management appointments and will take his psychotropic medications as prescribed   Goal #2- Management of anxiety  Obj #2-  The patient will verbalize three triggers for anxiety and will verbalize and utilize at least three effective coping mechanisms for anxiety [Recommended Frequency of Visits: ____] : Recommended frequency of visits: [unfilled] [de-identified] : The patient attended his scheduled session with the writer via A[employer]ity. The patient explained that he continues to feel anxious and depressed with little change. The writer explored this with the patient further and he noted that he has been having situations that causes his anxiety and depression. Patient reported that he was denied [facility] 9/11 disability due to lack of evidence of him being [place] [employer]. Patient expressed frustration with the decision. The writer actively listened to the patient, validated his feelings and support was rendered. The patient and the writer discussed the patient's next steps and the patient noted that he will be reaching out to [facility] headquarters for further information on obtaining potential records. The patient and the writer discussed the patient's activity levels for the last week, and he noted that he has been staying at home. Ongoing psychoeducation was provided to the patient regarding behavioral activation. The patient reports medication adherence. The writer will continue to provide the patient with support and encouragement where needed. The patient is scheduled for a follow up appointment on 4/4/2024 at 11:30am via telehealth per the patient's request.  [Return in ____ week(s)] : Return in [unfilled] week(s)

## 2024-03-29 DIAGNOSIS — F43.10 POST-TRAUMATIC STRESS DISORDER, UNSPECIFIED: ICD-10-CM

## 2024-04-02 ENCOUNTER — APPOINTMENT (OUTPATIENT)
Dept: PSYCHIATRY | Facility: CLINIC | Age: 49
End: 2024-04-02
Payer: COMMERCIAL

## 2024-04-02 ENCOUNTER — OUTPATIENT (OUTPATIENT)
Dept: OUTPATIENT SERVICES | Facility: HOSPITAL | Age: 49
LOS: 1 days | End: 2024-04-02
Payer: COMMERCIAL

## 2024-04-02 DIAGNOSIS — F43.10 POST-TRAUMATIC STRESS DISORDER, UNSPECIFIED: ICD-10-CM

## 2024-04-02 PROCEDURE — 99213 OFFICE O/P EST LOW 20 MIN: CPT | Mod: 95

## 2024-04-02 NOTE — PHYSICAL EXAM
[None] : none [Average] : average [Intermittent] : intermittent [Slowed] : slowed [Depressed] : depressed [Cooperative] : cooperative [Anxious] : anxious [Constricted] : constricted [Clear] : clear [Linear/Goal Directed] : linear/goal directed [WNL] : within normal limits [None Reported] : none reported [Mild] : mild [Moderate] : moderate [FreeTextEntry8] : less depressed [de-identified] : poor

## 2024-04-02 NOTE — RISK ASSESSMENT
[No, patient denies ideation or behavior] : No, patient denies ideation or behavior [Clinical Interview] : Clinical Interview [No] : No [Mood disorder] : mood disorder [Identifies reasons for living] : identifies reasons for living [PTSD] : PTSD [Supportive social network of family or friends] : supportive social network of family or friends [Taoism beliefs] : Yazdanism beliefs [Cultural, spiritual and/or moral attitudes against suicide] : cultural, spiritual and/or moral attitudes against suicide [Positive therapeutic relationships] : positive therapeutic relationships [None in the patient's lifetime] : None in the patient's lifetime [None Known] : none known [No known risk factors] : No known risk factors [Residential stability] : residential stability [Sobriety] : sobriety [Employment stability] : employment stability [Yes] : yes [de-identified] : the patient reports that he owns firearms that is stored away safely

## 2024-04-02 NOTE — SURGICAL HISTORY
[FreeTextEntry1] : The patient reports that he has had bunion surgery between [id number]. Patient reports having chronic foot pain.

## 2024-04-02 NOTE — SOCIAL HISTORY
[FreeTextEntry1] : Legal Status \par  Does individual Served have a Legal Guardian, rep Payee or Conservatorship?\par  [ X] No\par  [ ] Yes - Details: [ ] \par  \par  Legal Involvement history \par  Does the individual have a history of or current involvement with the legal system? \par  [ X] No\par  [ ] Yes - Details: [ ] \par  \par   Services \par  Have you ever served in the ? \par  [X ] No\par  [ ] Yes - Details: [ ] \par  \par  Employment history: Patient is currently a . 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 Patient reports that he is a naturalized citizen. \par  \par  \par  Social supports (friends, Volunteers, club, AA meeting, other meetings ) ? Patient reports that his wife, Joyce is his only social support. \par  \par  Meaningful Activities: video games\par  \par  \par  Spiritual Assessment Tool - [employer]\par  F. What is your carmen or belief? Episcopal\par  Do you consider yourself spiritual or Jewish? No \par  Is there something you believe in that gives meaning to your life? Joyce my wife \par  I: Is it important in your life? Yes \par  What influence does it have on how you take care of yourself? "I want to be a better person overall" \par  How have your beliefs influenced your behavior during this illness? What role do your beliefs play in regaining your health?  "I follow the teachings of Buddha" \par  C. Are you part of a spiritual or Jewish community? No \par  Is this of support to you and how? N/A \par  Is there a person or group of people you really love or who are really important to you? Joyce , mom and aunt \par  H. We have been discussing your belief and supports. What else gives you internal support? No\par  What are your sources of hope, strength, comfort and peace? "being home in my safe place" \par  What do you hold on to during difficult times? No \par  what sustains you and keeps you going? My wife \par  A. How would you like me, your healthcare provider, to address these issues in your healthcare? Talk about it \par  \par  SMOKING CESSATION \par  Do you Smoke?                              [ X] Yes		[ ] No\par  Do you want to quit? 		[ ] Yes		[X ] No\par  -ASK\par  	Number of cigarettes   [ ] cigars [ ]  pipe bowls [20 ]  per day \par  	Number of ST cans/ pouches per week [ 7]\par  	Number of years used [ over 30 years ]\par  	How soon after you wake up do you use tobacco?  [X ] within 30 minutes      [ ] more than 30 minutes \par  	Previous quit attempts:  # of attempts [ ] longest quit period [ ] methods(s) used [ ]- Never tried to quit smoking\par  How long ago was last attempt to quit  [ ] years [ ] months- NA \par  	Reasons for wanting to quit[ ] Patient reports that he does not want to quit smoking \par  -ADVISE   about the oral benefits of quitting\par  -ASSESS   willingness to make a quit attempt (Stage of Change) \par  	    [ ] Precontemplation (Stop here & Reassess next visit)	[ ] Contemplation [ ] Preparation  \par  -ASSIST    (depending on stage of change) \par  	Self-Help Pamphlets & Materials\par  	List of local community group/individual quit programs and phone help lines\par  	Encourage a quit date (for those who are ready)\par  	Pharmacotherapy: Nicotine gum/ Lozenge/ Patch/ Inhaler/NS/Zyban/ Chantix\par   	RX: 	[ ]  () \par  -ARRANGE  Follow up if set a quit date (with permission)\par  Quit Date: [ ]  Phone calls or visits:  [ ] Week 1-2  Months   [ ] 1   [ ] 3   [ ] 6   [ ] 12  \par  -Yearly Reassessment    [ ] No Changes of Smoking [ ] Change of Smoking Habit (Non-Smoker to Smoker/ Smoker to Non Smoker)\par  (If there is change from Non Smoker to Smoker, please fill out new BRIEF TOBACCO CESSATION INTERVENTION FORM) \par  Date of Yearly Reassessment : [ ]\par  Comments:  [ ]\par  \par

## 2024-04-02 NOTE — REASON FOR VISIT
[Home] : The patient, [unfilled], was located at home, [unfilled], at the time of the visit. [Telehealth (audio & video) - Individual/Group] : This visit was provided via telehealth using real-time 2-way audio visual technology. [Verbal consent obtained from patient/other participant(s)] : Verbal consent for telehealth/telephonic services obtained from patient/other participant(s) [OK  to leave message] : OK  to leave message [FreeTextEntry5] : Cantonese  [FreeTextEntry7] : he/him/his [FreeTextEntry6] : Maxime  [Non-[facility] Health Provider/Facility] : Non-[facility] Health Provider/Facility [Community Based Organization] : Community Based Organization [Patient] : Patient [Collateral - Name/Contact Info/Relationship:___] : Collateral: [unfilled] [FreeTextEntry2] : evaluation for anxiety,depression and PTSD related to 9/11 exposure  [FreeTextEntry1] : [facility]
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
## 2024-04-02 NOTE — FAMILY HISTORY
[FreeTextEntry1] : Family composition: wife, mom, aunt and one cousin in [place] only good relationship with mom. Father passed away in 2008. \par  Family history and background: Patient identifies as Chinese American\par  Family relationship: Patient reports that he has a good relationship with is family\par  Pertinent Family Medical, MH and Substance Use History including Adult Child of Alcoholic and child of substance abuse status; history of cancer and heart disease: Patient reports having hypertension, no history of substance use, the patient denied family history of substance use, breast cancer on mothers side and thyroid cancer.\par

## 2024-04-02 NOTE — HISTORY OF PRESENT ILLNESS
[FreeTextEntry1] : 4/2 Pt seen and evaluated. Pt continues to endorse depressed mood. Pt provides brief answers to questions. 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  Pt states he wants to go the cemetary site on his own as he does not want to "Deal with people". Pt still has not followed up with PCP regarding refitting of CPAP mask due to issues with his medical coverage. Pt was encouraged to do daily walking outside as weather is getting warmer. Pt denies any active or passive SI/HI/AVH at this time.   [FreeTextEntry2] : see above [FreeTextEntry3] : none

## 2024-04-03 DIAGNOSIS — F43.10 POST-TRAUMATIC STRESS DISORDER, UNSPECIFIED: ICD-10-CM

## 2024-04-04 ENCOUNTER — APPOINTMENT (OUTPATIENT)
Dept: PSYCHIATRY | Facility: CLINIC | Age: 49
End: 2024-04-04

## 2024-04-04 ENCOUNTER — OUTPATIENT (OUTPATIENT)
Dept: OUTPATIENT SERVICES | Facility: HOSPITAL | Age: 49
LOS: 1 days | End: 2024-04-04
Payer: COMMERCIAL

## 2024-04-04 DIAGNOSIS — F43.10 POST-TRAUMATIC STRESS DISORDER, UNSPECIFIED: ICD-10-CM

## 2024-04-04 PROCEDURE — 90832 PSYTX W PT 30 MINUTES: CPT | Mod: 95
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
## 2024-04-04 NOTE — REASON FOR VISIT
[Patient preference] : as per patient preference [Continuing, patient not seen in-person within last 12 months (provide details below)] : Telehealth services are continuing, patient not seen in-person within last 12 months.  [Telehealth (audio & video) - Individual/Group] : This visit was provided via telehealth using real-time 2-way audio visual technology. [Other Location: e.g. Home (Enter Location, City,State)___] : The provider was located at [unfilled]. [Home] : The patient, [unfilled], was located at home, [unfilled], at the time of the visit. [Verbal consent obtained from patient/other participant(s)] : Verbal consent for telehealth/telephonic services obtained from patient/other participant(s) [FreeTextEntry4] : 11:30AM  [FreeTextEntry5] : 11:50AM  [FreeTextEntry3] : the patient requested telehealth sessions and is appropriate for telehealth services  [Patient] : Patient [FreeTextEntry1] : psychotherapy follow up

## 2024-04-05 DIAGNOSIS — F43.10 POST-TRAUMATIC STRESS DISORDER, UNSPECIFIED: ICD-10-CM

## 2024-04-09 NOTE — END OF VISIT
[Time Spent: ___ minutes] : I have spent [unfilled] minutes of time on the encounter.
Human Bite    Human bite wounds tend to become infected, even when they seem minor at first. Infection can develop quickly, sometimes in a matter of hours. Bite wounds of the hand have a higher chance of infection compared to bites in other places and can be serious because the tendons and joints are close to the skin.    What are the signs or symptoms?  Common symptoms of a human bite include:    Bruising.  Broken skin.  Bleeding.  Pain.    How is this diagnosed?  This condition may be diagnosed based on a physical exam and medical history. Your health care provider will examine the wound and ask for details about how the bite happened. If you have details about the medical history of the person who bit you, it is important to tell your health care provider. This will help determine if there is any chance that a disease may have been spread. You may have tests, such as:    Blood tests. This may be done if there is a chance of infection from diseases such as hepatitis or HIV.  X-rays to check for damage to bones or joints.  Culture test. This uses a sample of fluid from the wound to check for infection.    How is this treated?  Treatment varies based on the location and severity of the bite and your medical history. Treatment may include:    Wound care. This often includes cleaning the wound, flushing the wound with saline solution, and applying a bandage (dressing). Sometimes, the wound is left open to heal because of the high risk of infection. However, in some cases, the wound may be closed with stitches (sutures), staples, skin glue, or adhesive strips.  Antibiotic medicine.  A flexible cast (splint).  Tetanus shot.    In some cases, bites that have become infected may require IV antibiotics and surgical treatment in the hospital.    Follow these instructions at home:  Wound care     Follow instructions from your health care provider about how to take care of your wound. Make sure you:    Wash your hands with soap and water before you change your dressing. If soap and water are not available, use hand .  Change your dressing as told by your health care provider.  Leave sutures, skin glue, or adhesive strips in place. These skin closures may need to be in place for 2 weeks or longer. If adhesive strip edges start to loosen and curl up, you may trim the loose edges. Do not remove adhesive strips completely unless your health care provider tells you to do that.    Check your wound every day for signs of infection. Watch for:    Increasing redness, swelling, or pain.  Fluid, blood, or pus.    General instructions     Take or apply over-the-counter and prescription medicines only as told by your health care provider.  If you were prescribed an antibiotic, take or apply it as told by your health care provider. Do not stop using the antibiotic even if your condition improves.  Keep the injured area raised (elevated) above the level of your heart while you are sitting or lying down, if this is possible.  If directed, apply ice to the injured area:    Put ice in a plastic bag.  Place a towel between your skin and the bag.  Leave the ice on for 20 minutes, 2–3 times per day.    Keep all follow-up visits as told by your health care provider. This is important.    Contact a health care provider if:  You have chills.  You have pain when you move your injured area.  You have trouble moving your injured area.  You are not improving, or you are getting worse.    Get help right away if:  You have increasing fluid, blood, or pus coming from your wound.  You have increasing redness, swelling, or pain at the site of your wound.  You have a red streak extending away from your wound.  You have a fever.  This information is not intended to replace advice given to you by your health care provider. Make sure you discuss any questions you have with your health care provider.

## 2024-04-18 ENCOUNTER — OUTPATIENT (OUTPATIENT)
Dept: OUTPATIENT SERVICES | Facility: HOSPITAL | Age: 49
LOS: 1 days | End: 2024-04-18
Payer: COMMERCIAL

## 2024-04-18 ENCOUNTER — APPOINTMENT (OUTPATIENT)
Dept: PSYCHIATRY | Facility: CLINIC | Age: 49
End: 2024-04-18

## 2024-04-18 DIAGNOSIS — F43.10 POST-TRAUMATIC STRESS DISORDER, UNSPECIFIED: ICD-10-CM

## 2024-04-18 PROCEDURE — 90832 PSYTX W PT 30 MINUTES: CPT | Mod: 95

## 2024-04-18 NOTE — REASON FOR VISIT
[Patient preference] : as per patient preference [Continuing, patient not seen in-person within last 12 months (provide details below)] : Telehealth services are continuing, patient not seen in-person within last 12 months.  [Telehealth (audio & video) - Individual/Group] : This visit was provided via telehealth using real-time 2-way audio visual technology. [Other Location: e.g. Home (Enter Location, City,State)___] : The provider was located at [unfilled]. [Home] : The patient, [unfilled], was located at home, [unfilled], at the time of the visit. [Verbal consent obtained from patient/other participant(s)] : Verbal consent for telehealth/telephonic services obtained from patient/other participant(s) [FreeTextEntry4] : 11:30am  [FreeTextEntry5] : 12:25pm [FreeTextEntry3] : the patient requested telehealth sessions and is appropriate for telehealth services  [Patient] : Patient [FreeTextEntry1] : psychotherapy follow up

## 2024-04-18 NOTE — PLAN
[FreeTextEntry2] : Goal #1- Management of depression  Obj #1- The patient will identify three triggers for his depression and will verbalize and utilize three effective coping mechanisms  Obj #2-  The patient will attend medication management appointments and will take his psychotropic medications as prescribed   Goal #2- Management of anxiety  Obj #2-  The patient will verbalize three triggers for anxiety and will verbalize and utilize at least three effective coping mechanisms for anxiety [Cognitive and/or Behavior Therapy] : Cognitive and/or Behavior Therapy  [Skills training (all types)] : Skills training (all types)  [Supportive Therapy] : Supportive Therapy [de-identified] : The patient attended his scheduled session with the writer via Pro[employer]Mail. The patient explained that he has had no changes within his mood and his anxiety. The patient explained that he recently went out with his friends and he noted that he was anxious with his friends and he was too anxious to drive so he took public transportation. The patient noted that the train ride further exacerbated his anxiety.

## 2024-04-23 ENCOUNTER — APPOINTMENT (OUTPATIENT)
Dept: PSYCHIATRY | Facility: CLINIC | Age: 49
End: 2024-04-23

## 2024-04-23 ENCOUNTER — OUTPATIENT (OUTPATIENT)
Dept: OUTPATIENT SERVICES | Facility: HOSPITAL | Age: 49
LOS: 1 days | End: 2024-04-23
Payer: COMMERCIAL

## 2024-04-23 DIAGNOSIS — F43.10 POST-TRAUMATIC STRESS DISORDER, UNSPECIFIED: ICD-10-CM

## 2024-04-23 PROCEDURE — 90832 PSYTX W PT 30 MINUTES: CPT | Mod: 95

## 2024-04-23 NOTE — REASON FOR VISIT
[Patient preference] : as per patient preference [Continuing, patient not seen in-person within last 12 months (provide details below)] : Telehealth services are continuing, patient not seen in-person within last 12 months.  [Telehealth (audio & video) - Individual/Group] : This visit was provided via telehealth using real-time 2-way audio visual technology. [Other Location: e.g. Home (Enter Location, City,State)___] : The provider was located at [unfilled]. [Home] : The patient, [unfilled], was located at home, [unfilled], at the time of the visit. [Verbal consent obtained from patient/other participant(s)] : Verbal consent for telehealth/telephonic services obtained from patient/other participant(s) [FreeTextEntry4] : 11:30am [FreeTextEntry5] : 11:49am  [FreeTextEntry3] : the patient requested telehealth sessions and is appropriate for telehealth services  [Patient] : Patient [FreeTextEntry1] : psychotherapy follow up
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
## 2024-04-24 DIAGNOSIS — F43.10 POST-TRAUMATIC STRESS DISORDER, UNSPECIFIED: ICD-10-CM

## 2024-04-30 ENCOUNTER — APPOINTMENT (OUTPATIENT)
Dept: PSYCHIATRY | Facility: CLINIC | Age: 49
End: 2024-04-30
Payer: COMMERCIAL

## 2024-04-30 ENCOUNTER — OUTPATIENT (OUTPATIENT)
Dept: OUTPATIENT SERVICES | Facility: HOSPITAL | Age: 49
LOS: 1 days | End: 2024-04-30
Payer: COMMERCIAL

## 2024-04-30 DIAGNOSIS — F43.10 POST-TRAUMATIC STRESS DISORDER, UNSPECIFIED: ICD-10-CM

## 2024-04-30 PROCEDURE — 99214 OFFICE O/P EST MOD 30 MIN: CPT | Mod: 95

## 2024-04-30 NOTE — FAMILY HISTORY
[FreeTextEntry1] : Family composition: wife, mom, aunt and one cousin in [place] only good relationship with mom. Father passed away in 2008. \par  Family history and background: Patient identifies as Chinese American\par  Family relationship: Patient reports that he has a good relationship with is family\par  Pertinent Family Medical, MH and Substance Use History including Adult Child of Alcoholic and child of substance abuse status; history of cancer and heart disease: Patient reports having hypertension, no history of substance use, the patient denied family history of substance use, breast cancer on mothers side and thyroid cancer.\par

## 2024-04-30 NOTE — PHYSICAL EXAM
[None] : none [Average] : average [Intermittent] : intermittent [Slowed] : slowed [Cooperative] : cooperative [Depressed] : depressed [Anxious] : anxious [Constricted] : constricted [Clear] : clear [Linear/Goal Directed] : linear/goal directed [WNL] : within normal limits [None Reported] : none reported [Mild] : mild [Moderate] : moderate [FreeTextEntry8] : less depressed [de-identified] : poor

## 2024-04-30 NOTE — SURGICAL HISTORY
[FreeTextEntry1] : The patient reports that he has had bunion surgery between [id number]. Patient reports having chronic foot pain.

## 2024-04-30 NOTE — HISTORY OF PRESENT ILLNESS
[FreeTextEntry1] :  Pt seen and evaluated. Pt continues to endorse depressed mood. Pt reports he is still processing the death of his maday. Pt reports he is thinking about applying for social security. Pt states he has jury duty next week. Pt reports he has been experiencing nightmares that occur several nights in a week. Pt reports his nightmares are about "people dying". Pt reports he does wake up due to his nightmares and is awake for an hour or two.  Pt was encouraged to do daily walking outside as weather is getting warmer. Pt denies any active or passive SI/HI/AVH at this time.   [FreeTextEntry2] : see above [FreeTextEntry3] : none

## 2024-04-30 NOTE — SOCIAL HISTORY
[FreeTextEntry1] : Legal Status \par  Does individual Served have a Legal Guardian, rep Payee or Conservatorship?\par  [ X] No\par  [ ] Yes - Details: [ ] \par  \par  Legal Involvement history \par  Does the individual have a history of or current involvement with the legal system? \par  [ X] No\par  [ ] Yes - Details: [ ] \par  \par   Services \par  Have you ever served in the ? \par  [X ] No\par  [ ] Yes - Details: [ ] \par  \par  Employment history: Patient is currently a . Patient is a member of the [place] [employer] department for over 20 years.\par  \par  Developmental history: N/A \par  \par  Sexual hx/identity Sexual History/ Concern (include sexual orientation and other relevant information): Heterosexual cisgender male \par  \par  Race - ethnicity - culture information:  American born in [place] and [place] Patient reports that he is a naturalized citizen. \par  \par  \par  Social supports (friends, Volunteers, club, AA meeting, other meetings ) ? Patient reports that his wife, Joyce is his only social support. \par  \par  Meaningful Activities: video games\par  \par  \par  Spiritual Assessment Tool - FICA\par  F. What is your carmen or belief? Restoration\par  Do you consider yourself spiritual or Pentecostal? No \par  Is there something you believe in that gives meaning to your life? Joyce my wife \par  I: Is it important in your life? Yes \par  What influence does it have on how you take care of yourself? "I want to be a better person overall" \par  How have your beliefs influenced your behavior during this illness? What role do your beliefs play in regaining your health?  "I follow the teachings of Buddha" \par  C. Are you part of a spiritual or Pentecostal community? No \par  Is this of support to you and how? N/A \par  Is there a person or group of people you really love or who are really important to you? Joyce , mom and aunt \par  H. We have been discussing your belief and supports. What else gives you internal support? No\par  What are your sources of hope, strength, comfort and peace? "being home in my safe place" \par  What do you hold on to during difficult times? No \par  what sustains you and keeps you going? My wife \par  A. How would you like me, your healthcare provider, to address these issues in your healthcare? Talk about it \par  \par  SMOKING CESSATION \par  Do you Smoke?                              [ X] Yes		[ ] No\par  Do you want to quit? 		[ ] Yes		[X ] No\par  -ASK\par  	Number of cigarettes   [ ] cigars [ ]  pipe bowls [20 ]  per day \par  	Number of ST cans/ pouches per week [ 7]\par  	Number of years used [ over 30 years ]\par  	How soon after you wake up do you use tobacco?  [X ] within 30 minutes      [ ] more than 30 minutes \par  	Previous quit attempts:  # of attempts [ ] longest quit period [ ] methods(s) used [ ]- Never tried to quit smoking\par  How long ago was last attempt to quit  [ ] years [ ] months- NA \par  	Reasons for wanting to quit[ ] Patient reports that he does not want to quit smoking \par  -ADVISE   about the oral benefits of quitting\par  -ASSESS   willingness to make a quit attempt (Stage of Change) \par  	    [ ] Precontemplation (Stop here & Reassess next visit)	[ ] Contemplation [ ] Preparation  \par  -ASSIST    (depending on stage of change) \par  	Self-Help Pamphlets & Materials\par  	List of local community group/individual quit programs and phone help lines\par  	Encourage a quit date (for those who are ready)\par  	Pharmacotherapy: Nicotine gum/ Lozenge/ Patch/ Inhaler/NS/Zyban/ Chantix\par   	RX: 	[ ]  () \par  -ARRANGE  Follow up if set a quit date (with permission)\par  Quit Date: [ ]  Phone calls or visits:  [ ] Week 1-2  Months   [ ] 1   [ ] 3   [ ] 6   [ ] 12  \par  -Yearly Reassessment    [ ] No Changes of Smoking [ ] Change of Smoking Habit (Non-Smoker to Smoker/ Smoker to Non Smoker)\par  (If there is change from Non Smoker to Smoker, please fill out new BRIEF TOBACCO CESSATION INTERVENTION FORM) \par  Date of Yearly Reassessment : [ ]\par  Comments:  [ ]\par  \par

## 2024-04-30 NOTE — DISCUSSION/SUMMARY
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 Pt denies any mental health conditions prior to 9/11), no prior IPP, no SA, never on meds, c/o feeling depressed, having anhedonia, apathy, agoraphobia, flashbacks, hypervigilance, etc. Pt was Dxd with PTSD by 9/11 fund and referred to us for treatment. Denies MELINA.  Pt reports worsening nightmares, will start Prazosin to aid with nightmares.   1. Next appt in 4 weeks 2. continue Prozac 60mg qdaily 3. Advised pt to follow up with medical doctor regarding sleep apnea and re-fitted for CPAP machine 4. continue Trazodone 150mg qhs PRN for insomnia 5. Pt has reduced caffeine intake 6. start Prazosin 1mg qhs for nightmares

## 2024-04-30 NOTE — REASON FOR VISIT
[Telehealth (audio & video) - Individual/Group] : This visit was provided via telehealth using real-time 2-way audio visual technology. [Home] : The patient, [unfilled], was located at home, [unfilled], at the time of the visit. [Verbal consent obtained from patient/other participant(s)] : Verbal consent for telehealth/telephonic services obtained from patient/other participant(s) [OK  to leave message] : OK  to leave message [FreeTextEntry5] : Cantonese  [FreeTextEntry6] : Maxime  [FreeTextEntry7] : he/him/his [Community Based Organization] : Community Based Organization [Non-[facility] Health Provider/Facility] : Non-[facility] Health Provider/Facility [Patient] : Patient [Collateral - Name/Contact Info/Relationship:___] : Collateral: [unfilled] [FreeTextEntry2] : evaluation for anxiety,depression and PTSD related to 9/11 exposure  [FreeTextEntry1] : "Since I was certified I have PTSD. I have nightmares sometimes. I have more anxiety" \par

## 2024-04-30 NOTE — RISK ASSESSMENT
[No, patient denies ideation or behavior] : No, patient denies ideation or behavior [Clinical Interview] : Clinical Interview [No] : No [Mood disorder] : mood disorder [PTSD] : PTSD [Identifies reasons for living] : identifies reasons for living [Supportive social network of family or friends] : supportive social network of family or friends [Mosque beliefs] : Congregational beliefs [Cultural, spiritual and/or moral attitudes against suicide] : cultural, spiritual and/or moral attitudes against suicide [Positive therapeutic relationships] : positive therapeutic relationships [None in the patient's lifetime] : None in the patient's lifetime [None Known] : none known [No known risk factors] : No known risk factors [Residential stability] : residential stability [Employment stability] : employment stability [Sobriety] : sobriety [Yes] : yes [de-identified] : the patient reports that he owns firearms that is stored away safely

## 2024-05-01 DIAGNOSIS — F43.10 POST-TRAUMATIC STRESS DISORDER, UNSPECIFIED: ICD-10-CM

## 2024-05-02 ENCOUNTER — APPOINTMENT (OUTPATIENT)
Dept: PSYCHIATRY | Facility: CLINIC | Age: 49
End: 2024-05-02

## 2024-05-10 ENCOUNTER — APPOINTMENT (OUTPATIENT)
Dept: PSYCHIATRY | Facility: CLINIC | Age: 49
End: 2024-05-10

## 2024-05-10 ENCOUNTER — OUTPATIENT (OUTPATIENT)
Dept: OUTPATIENT SERVICES | Facility: HOSPITAL | Age: 49
LOS: 1 days | End: 2024-05-10
Payer: COMMERCIAL

## 2024-05-10 DIAGNOSIS — F43.10 POST-TRAUMATIC STRESS DISORDER, UNSPECIFIED: ICD-10-CM

## 2024-05-10 PROCEDURE — 90832 PSYTX W PT 30 MINUTES: CPT | Mod: 95

## 2024-05-11 DIAGNOSIS — F43.10 POST-TRAUMATIC STRESS DISORDER, UNSPECIFIED: ICD-10-CM

## 2024-05-13 NOTE — PLAN
[Cognitive and/or Behavior Therapy] : Cognitive and/or Behavior Therapy  [Skills training (all types)] : Skills training (all types)  [Supportive Therapy] : Supportive Therapy [Recommended Frequency of Visits: ____] : Recommended frequency of visits: [unfilled] [Return in ____ week(s)] : Return in [unfilled] week(s) [FreeTextEntry2] : Goal #1- Management of depression  Obj #1- The patient will identify three triggers for his depression and will verbalize and utilize three effective coping mechanisms  Obj #2-  The patient will attend medication management appointments and will take his psychotropic medications as prescribed   Goal #2- Management of anxiety  Obj #2-  The patient will verbalize three triggers for anxiety and will verbalize and utilize at least three effective coping mechanisms for anxiety [de-identified] : The patient attended his scheduled session with the writer via [employer]. The patient noted that his anxiety and depression levels remain the same and it has not been getting any worse and or any better. Patient noted that he had jury duty on Monday and he was feeling nervous and anxious. The writer explored this with the patient further and he explained that he felt "panicky" as there were too much people and he had to take public transportation. The patient and the writer discussed ongoing coping skills and distress tolerance. The patient explained that he has been having an increase in nightmares however, the prazosin has been helpful in reducing these instances The patient and the writer discussed behavioral activation and ways that he can incorporate this. The patient reports medication adherence. The writer will continue to provide the patient with support and encouragement where needed. The patient is scheduled for a follow up appointment on 5/16/2024 at 11:30am via telehealth per the patient's request.

## 2024-05-13 NOTE — REASON FOR VISIT
[Patient preference] : as per patient preference [Continuing, patient not seen in-person within last 12 months (provide details below)] : Telehealth services are continuing, patient not seen in-person within last 12 months.  [Telehealth (audio & video) - Individual/Group] : This visit was provided via telehealth using real-time 2-way audio visual technology. [Other Location: e.g. Home (Enter Location, City,State)___] : The provider was located at [unfilled]. [Home] : The patient, [unfilled], was located at home, [unfilled], at the time of the visit. [Verbal consent obtained from patient/other participant(s)] : Verbal consent for telehealth/telephonic services obtained from patient/other participant(s) [Patient] : Patient [FreeTextEntry4] : 11:33am  [FreeTextEntry5] : 11:51am  [FreeTextEntry3] : the patient requested telehealth sessions and is appropriate for telehealth services  [FreeTextEntry1] : psychotherapy follow up

## 2024-05-16 ENCOUNTER — APPOINTMENT (OUTPATIENT)
Dept: PSYCHIATRY | Facility: CLINIC | Age: 49
End: 2024-05-16

## 2024-05-16 ENCOUNTER — OUTPATIENT (OUTPATIENT)
Dept: OUTPATIENT SERVICES | Facility: HOSPITAL | Age: 49
LOS: 1 days | End: 2024-05-16
Payer: COMMERCIAL

## 2024-05-16 DIAGNOSIS — F43.10 POST-TRAUMATIC STRESS DISORDER, UNSPECIFIED: ICD-10-CM

## 2024-05-16 PROCEDURE — 90832 PSYTX W PT 30 MINUTES: CPT | Mod: 95

## 2024-05-17 DIAGNOSIS — F43.10 POST-TRAUMATIC STRESS DISORDER, UNSPECIFIED: ICD-10-CM

## 2024-05-17 NOTE — PLAN
[Cognitive and/or Behavior Therapy] : Cognitive and/or Behavior Therapy  [Skills training (all types)] : Skills training (all types)  [Supportive Therapy] : Supportive Therapy [FreeTextEntry2] : Goal #1- Management of depression  Obj #1- The patient will identify three triggers for his depression and will verbalize and utilize three effective coping mechanisms  Obj #2-  The patient will attend medication management appointments and will take his psychotropic medications as prescribed   Goal #2- Management of anxiety  Obj #2-  The patient will verbalize three triggers for anxiety and will verbalize and utilize at least three effective coping mechanisms for anxiety [de-identified] : The patient attended his scheduled session with the writer via [employer]. The patient endorsed feeling "the same" regarding his anxiety and depression. The patient explained that he has been waking up in the middle of the night as he has been having racing thoughts. The writer explored this with the patient further and he noted that he has been stressed regarding work as he continues to be on leave. The writer actively listened to the patient, validated his feelings and support was rendered. Ongoing use of behavioral activation is encouraged as the patient noted that he tends to sleep during the day out of "some boredom and also sometimes I am tired". Patient explained that there is multiple house tasks that need to be completed however, "I have not done them yet". The writer assisted the patient with breaking down the tasks that he needs to complete into more manageable parts.  Writer recommended the establishment of some form of a routine. The patient reports medication adherence. The writer will continue to provide the patient with support and encouragement where needed. The patient is scheduled for a follow up appointment on 5/24/2024 at 11:30am via telehealth per the patient's request.  [Recommended Frequency of Visits: ____] : Recommended frequency of visits: [unfilled] [Return in ____ week(s)] : Return in [unfilled] week(s)

## 2024-05-17 NOTE — REASON FOR VISIT
[Patient preference] : as per patient preference [Continuing, patient not seen in-person within last 12 months (provide details below)] : Telehealth services are continuing, patient not seen in-person within last 12 months.  [Telehealth (audio & video) - Individual/Group] : This visit was provided via telehealth using real-time 2-way audio visual technology. [Medical Office: ([facility])___] : The provider was located at the medical office in [unfilled]. [Home] : The patient, [unfilled], was located at home, [unfilled], at the time of the visit. [Verbal consent obtained from patient/other participant(s)] : Verbal consent for telehealth/telephonic services obtained from patient/other participant(s) [Patient] : Patient [FreeTextEntry4] : 11:30am  [FreeTextEntry5] : 11:48am  [FreeTextEntry3] : the patient requested telehealth sessions and is appropriate for telehealth services  [FreeTextEntry1] : psychotherapy follow up

## 2024-05-20 ENCOUNTER — OUTPATIENT (OUTPATIENT)
Dept: OUTPATIENT SERVICES | Facility: HOSPITAL | Age: 49
LOS: 1 days | End: 2024-05-20
Payer: COMMERCIAL

## 2024-05-20 ENCOUNTER — APPOINTMENT (OUTPATIENT)
Dept: PSYCHIATRY | Facility: CLINIC | Age: 49
End: 2024-05-20
Payer: COMMERCIAL

## 2024-05-20 DIAGNOSIS — F43.10 POST-TRAUMATIC STRESS DISORDER, UNSPECIFIED: ICD-10-CM

## 2024-05-20 PROCEDURE — 99214 OFFICE O/P EST MOD 30 MIN: CPT | Mod: 95

## 2024-05-20 RX ORDER — FLUOXETINE HYDROCHLORIDE 40 MG/1
40 CAPSULE ORAL
Qty: 30 | Refills: 2 | Status: ACTIVE | COMMUNITY
Start: 2023-02-27 | End: 1900-01-01

## 2024-05-20 RX ORDER — FLUOXETINE HYDROCHLORIDE 20 MG/1
20 CAPSULE ORAL DAILY
Qty: 30 | Refills: 2 | Status: ACTIVE | COMMUNITY
Start: 2024-01-22 | End: 1900-01-01

## 2024-05-20 RX ORDER — TRAZODONE HYDROCHLORIDE 150 MG/1
150 TABLET ORAL
Qty: 30 | Refills: 2 | Status: ACTIVE | COMMUNITY
Start: 2023-12-21 | End: 1900-01-01

## 2024-05-20 NOTE — FAMILY HISTORY
[FreeTextEntry1] : Family composition: wife, mom, aunt and one cousin in [place] only good relationship with mom. Father passed away in 2008. \par  Family history and background: Patient identifies as Chinese American\par  Family relationship: Patient reports that he has a good relationship with is family\par  Pertinent Family Medical, MH and Substance Use History including Adult Child of Alcoholic and child of substance abuse status; history of cancer and heart disease: Patient reports having hypertension, no history of substance use, the patient denied family history of substance use, breast cancer on mothers side and thyroid cancer.\par

## 2024-05-20 NOTE — DISCUSSION/SUMMARY
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 Pt denies any mental health conditions prior to 9/11), no prior IPP, no SA, never on meds, c/o feeling depressed, having anhedonia, apathy, agoraphobia, flashbacks, hypervigilance, etc. Pt was Dxd with PTSD by 9/11 fund and referred to us for treatment. Denies MELINA.  Will increase Prazosin to aid with nightmares.   1. Next appt in 3 momths 2. continue Prozac 60mg qdaily 3. Advised pt to follow up with medical doctor regarding sleep apnea and re-fitted for CPAP machine 4. continue Trazodone 150mg qhs PRN for insomnia 5. Pt has reduced caffeine intake 6. increase Prazosin to 2mg qhs for nightmares

## 2024-05-20 NOTE — HISTORY OF PRESENT ILLNESS
[FreeTextEntry1] : Pt seen and evaluated. Pt continues to endorse depressed mood although affect appears slightly brighter today. Pt reports some improvement in nightmares since starting Prazosin. Pt reports the nightmares only occur several nights per week and continue to wake him up from sleep however pt reports he is able to fall back asleep sooner within minutes and is not awake for an hour or two compared to last month. Pt denies any active or passive SI/HI/AVH at this time.   [FreeTextEntry2] : see above [FreeTextEntry3] : none

## 2024-05-20 NOTE — REASON FOR VISIT
[Telehealth (audio & video) - Individual/Group] : This visit was provided via telehealth using real-time 2-way audio visual technology. [Home] : The patient, [unfilled], was located at home, [unfilled], at the time of the visit. [Verbal consent obtained from patient/other participant(s)] : Verbal consent for telehealth/telephonic services obtained from patient/other participant(s) [OK  to leave message] : OK  to leave message [FreeTextEntry5] : Cantonese  [FreeTextEntry6] : Maxime  [FreeTextEntry7] : he/him/his [Community Based Organization] : Community Based Organization [Non-[facility] Health Provider/Facility] : Non-[facility] Health Provider/Facility [Patient] : Patient [Collateral - Name/Contact Info/Relationship:___] : Collateral: [unfilled] [FreeTextEntry2] : evaluation for anxiety,depression and PTSD related to 9/11 exposure  [FreeTextEntry1] : "Since I was certified I have PTSD. I have nightmares sometimes. I have more anxiety" \par

## 2024-05-20 NOTE — SOCIAL HISTORY
[FreeTextEntry1] : Legal Status \par  Does individual Served have a Legal Guardian, rep Payee or Conservatorship?\par  [ X] No\par  [ ] Yes - Details: [ ] \par  \par  Legal Involvement history \par  Does the individual have a history of or current involvement with the legal system? \par  [ X] No\par  [ ] Yes - Details: [ ] \par  \par   Services \par  Have you ever served in the ? \par  [X ] No\par  [ ] Yes - Details: [ ] \par  \par  Employment history: Patient is currently a . Patient is a member of the [employer] department for over 20 years.\par  \par  Developmental history: N/A \par  \par  Sexual hx/identity Sexual History/ Concern (include sexual orientation and other relevant information): Heterosexual cisgender male \par  \par  Race - ethnicity - culture information:  American born in [place] and [place] Patient reports that he is a naturalized citizen. \par  \par  \par  Social supports (friends, Volunteers, club, AA meeting, other meetings ) ? Patient reports that his wife, Joyce is his only social support. \par  \par  Meaningful Activities: video games\par  \par  \par  Spiritual Assessment Tool - FICA\par  F. What is your carmen or belief? Religion\par  Do you consider yourself spiritual or Bahai? No \par  Is there something you believe in that gives meaning to your life? Joyce my wife \par  I: Is it important in your life? Yes \par  What influence does it have on how you take care of yourself? "I want to be a better person overall" \par  How have your beliefs influenced your behavior during this illness? What role do your beliefs play in regaining your health?  "I follow the teachings of Buddha" \par  C. Are you part of a spiritual or Bahai community? No \par  Is this of support to you and how? N/A \par  Is there a person or group of people you really love or who are really important to you? Joyce , mom and aunt \par  H. We have been discussing your belief and supports. What else gives you internal support? No\par  What are your sources of hope, strength, comfort and peace? "being home in my safe place" \par  What do you hold on to during difficult times? No \par  what sustains you and keeps you going? My wife \par  A. How would you like me, your healthcare provider, to address these issues in your healthcare? Talk about it \par  \par  SMOKING CESSATION \par  Do you Smoke?                              [ X] Yes		[ ] No\par  Do you want to quit? 		[ ] Yes		[X ] No\par  -ASK\par  	Number of cigarettes   [ ] cigars [ ]  pipe bowls [20 ]  per day \par  	Number of ST cans/ pouches per week [ 7]\par  	Number of years used [ over 30 years ]\par  	How soon after you wake up do you use tobacco?  [X ] within 30 minutes      [ ] more than 30 minutes \par  	Previous quit attempts:  # of attempts [ ] longest quit period [ ] methods(s) used [ ]- Never tried to quit smoking\par  How long ago was last attempt to quit  [ ] years [ ] months- NA \par  	Reasons for wanting to quit[ ] Patient reports that he does not want to quit smoking \par  -ADVISE   about the oral benefits of quitting\par  -ASSESS   willingness to make a quit attempt (Stage of Change) \par  	    [ ] Precontemplation (Stop here & Reassess next visit)	[ ] Contemplation [ ] Preparation  \par  -ASSIST    (depending on stage of change) \par  	Self-Help Pamphlets & Materials\par  	List of local community group/individual quit programs and phone help lines\par  	Encourage a quit date (for those who are ready)\par  	Pharmacotherapy: Nicotine gum/ Lozenge/ Patch/ Inhaler/NS/Zyban/ Chantix\par   	RX: 	[ ]  () \par  -ARRANGE  Follow up if set a quit date (with permission)\par  Quit Date: [ ]  Phone calls or visits:  [ ] Week 1-2  Months   [ ] 1   [ ] 3   [ ] 6   [ ] 12  \par  -Yearly Reassessment    [ ] No Changes of Smoking [ ] Change of Smoking Habit (Non-Smoker to Smoker/ Smoker to Non Smoker)\par  (If there is change from Non Smoker to Smoker, please fill out new BRIEF TOBACCO CESSATION INTERVENTION FORM) \par  Date of Yearly Reassessment : [ ]\par  Comments:  [ ]\par  \par

## 2024-05-20 NOTE — SURGICAL HISTORY
[FreeTextEntry1] : The patient reports that he has had bunion surgery between [id number]. Patient reports having chronic foot pain.

## 2024-05-20 NOTE — RISK ASSESSMENT
[No, patient denies ideation or behavior] : No, patient denies ideation or behavior [Clinical Interview] : Clinical Interview [No] : No [Mood disorder] : mood disorder [PTSD] : PTSD [Identifies reasons for living] : identifies reasons for living [Supportive social network of family or friends] : supportive social network of family or friends [Mandaeism beliefs] : Judaism beliefs [Cultural, spiritual and/or moral attitudes against suicide] : cultural, spiritual and/or moral attitudes against suicide [Positive therapeutic relationships] : positive therapeutic relationships [None in the patient's lifetime] : None in the patient's lifetime [None Known] : none known [No known risk factors] : No known risk factors [Residential stability] : residential stability [Employment stability] : employment stability [Sobriety] : sobriety [Yes] : yes [de-identified] : the patient reports that he owns firearms that is stored away safely

## 2024-05-20 NOTE — PHYSICAL EXAM
[None] : none [Average] : average [Intermittent] : intermittent [Slowed] : slowed [Cooperative] : cooperative [Depressed] : depressed [Anxious] : anxious [Constricted] : constricted [Clear] : clear [Linear/Goal Directed] : linear/goal directed [WNL] : within normal limits [None Reported] : none reported [Mild] : mild [Moderate] : moderate [FreeTextEntry8] : less depressed [de-identified] : poor

## 2024-05-21 DIAGNOSIS — F43.10 POST-TRAUMATIC STRESS DISORDER, UNSPECIFIED: ICD-10-CM

## 2024-05-24 ENCOUNTER — OUTPATIENT (OUTPATIENT)
Dept: OUTPATIENT SERVICES | Facility: HOSPITAL | Age: 49
LOS: 1 days | End: 2024-05-24
Payer: COMMERCIAL

## 2024-05-24 ENCOUNTER — APPOINTMENT (OUTPATIENT)
Dept: PSYCHIATRY | Facility: CLINIC | Age: 49
End: 2024-05-24

## 2024-05-24 DIAGNOSIS — F43.10 POST-TRAUMATIC STRESS DISORDER, UNSPECIFIED: ICD-10-CM

## 2024-05-24 PROCEDURE — 90832 PSYTX W PT 30 MINUTES: CPT

## 2024-05-24 NOTE — REASON FOR VISIT
[Patient preference] : as per patient preference [Continuing, patient not seen in-person within last 12 months (provide details below)] : Telehealth services are continuing, patient not seen in-person within last 12 months.  [Telehealth (audio & video) - Individual/Group] : This visit was provided via telehealth using real-time 2-way audio visual technology. [Other Location: e.g. Home (Enter Location, City,State)___] : The provider was located at [unfilled]. [Home] : The patient, [unfilled], was located at home, [unfilled], at the time of the visit. [Verbal consent obtained from patient/other participant(s)] : Verbal consent for telehealth/telephonic services obtained from patient/other participant(s) [FreeTextEntry4] : 11:31am  [FreeTextEntry5] : 11:49am [FreeTextEntry3] : the patient requested telehealth sessions and is appropriate for telehealth services [Patient] : Patient [FreeTextEntry1] : psychotherapy follow up

## 2024-05-24 NOTE — PLAN
[FreeTextEntry2] : Goal #1- Management of depression  Obj #1- The patient will identify three triggers for his depression and will verbalize and utilize three effective coping mechanisms  Obj #2-  The patient will attend medication management appointments and will take his psychotropic medications as prescribed   Goal #2- Management of anxiety  Obj #2-  The patient will verbalize three triggers for anxiety and will verbalize and utilize at least three effective coping mechanisms for anxiety [Cognitive and/or Behavior Therapy] : Cognitive and/or Behavior Therapy  [Skills training (all types)] : Skills training (all types)  [Supportive Therapy] : Supportive Therapy [de-identified] : The patient attended his scheduled session with the writer via [employer]. The patient noted limited changes to his anxiety and depression. He explained that he continues to have a lack of motivation to complete tasks despite having multiple things that need to be completed. The writer assisted the patient with breaking down the tasks that he needs to complete in smaller manageable parts. The writer encouraged the patient to create a schedule that he can realistically adhere to. Patient and the writer discussed going outdoors more often as he reports that he has been staying inside. The patient reports medication adherence. The writer will continue to provide the patient  with support and encouragement where needed. The patient is scheduled for a follow up appointment  on 5/302/2024 at 11:30am via telehealth per the patient's request.  [Recommended Frequency of Visits: ____] : Recommended frequency of visits: [unfilled] [Return in ____ week(s)] : Return in [unfilled] week(s)

## 2024-05-25 DIAGNOSIS — F43.10 POST-TRAUMATIC STRESS DISORDER, UNSPECIFIED: ICD-10-CM

## 2024-05-30 ENCOUNTER — OUTPATIENT (OUTPATIENT)
Dept: OUTPATIENT SERVICES | Facility: HOSPITAL | Age: 49
LOS: 1 days | End: 2024-05-30
Payer: COMMERCIAL

## 2024-05-30 ENCOUNTER — APPOINTMENT (OUTPATIENT)
Dept: PSYCHIATRY | Facility: CLINIC | Age: 49
End: 2024-05-30

## 2024-05-30 DIAGNOSIS — F43.10 POST-TRAUMATIC STRESS DISORDER, UNSPECIFIED: ICD-10-CM

## 2024-05-30 PROCEDURE — 90832 PSYTX W PT 30 MINUTES: CPT | Mod: 95

## 2024-05-30 NOTE — REASON FOR VISIT
[Patient preference] : as per patient preference [Continuing, patient not seen in-person within last 12 months (provide details below)] : Telehealth services are continuing, patient not seen in-person within last 12 months.  [Telehealth (audio & video) - Individual/Group] : This visit was provided via telehealth using real-time 2-way audio visual technology. [Medical Office: ([facility])___] : The provider was located at the medical office in [unfilled]. [Home] : The patient, [unfilled], was located at home, [unfilled], at the time of the visit. [Verbal consent obtained from patient/other participant(s)] : Verbal consent for telehealth/telephonic services obtained from patient/other participant(s) [FreeTextEntry4] : 11:30AM  [FreeTextEntry5] : 12PM  [FreeTextEntry3] : the patient requested telehealth sessions and is appropriate for telehealth services  [Patient] : Patient [FreeTextEntry1] : psychotherapy follow up

## 2024-05-30 NOTE — PLAN
[FreeTextEntry2] : Goal #1- Management of depression  Obj #1- The patient will identify three triggers for his depression and will verbalize and utilize three effective coping mechanisms  Obj #2-  The patient will attend medication management appointments and will take his psychotropic medications as prescribed   Goal #2- Management of anxiety  Obj #2-  The patient will verbalize three triggers for anxiety and will verbalize and utilize at least three effective coping mechanisms for anxiety [Cognitive and/or Behavior Therapy] : Cognitive and/or Behavior Therapy  [Skills training (all types)] : Skills training (all types)  [Supportive Therapy] : Supportive Therapy [de-identified] : The patient attended his scheduled session with the writer via [employer]. The patient noted that he has been feeling "a little better". The writer explored positive changes that the patient has implemented, and he noted that he has been trying to adhere to a daily schedule. The writer commended the patient. Patient and writer additional tasks that needs to be completed and he advised that he needs to clean his living room and he would like to clear off his couch. Patient and writer discussed how seeing progress in his home could be a way for him to further boost his motivation. The patient reports increased sleep. The patient reports medication adherence. The writer will continue to provide the patient with support and encouragement where needed. The patient is scheduled for a follow up appointment on 6/4/2024 at 11am via telehealth per the patient's request.  [Recommended Frequency of Visits: ____] : Recommended frequency of visits: [unfilled] [Return in ____ week(s)] : Return in [unfilled] week(s)

## 2024-05-31 DIAGNOSIS — F43.10 POST-TRAUMATIC STRESS DISORDER, UNSPECIFIED: ICD-10-CM

## 2024-06-04 ENCOUNTER — APPOINTMENT (OUTPATIENT)
Dept: PSYCHIATRY | Facility: CLINIC | Age: 49
End: 2024-06-04

## 2024-06-04 ENCOUNTER — OUTPATIENT (OUTPATIENT)
Dept: OUTPATIENT SERVICES | Facility: HOSPITAL | Age: 49
LOS: 1 days | End: 2024-06-04
Payer: COMMERCIAL

## 2024-06-04 DIAGNOSIS — F43.10 POST-TRAUMATIC STRESS DISORDER, UNSPECIFIED: ICD-10-CM

## 2024-06-04 PROCEDURE — 90832 PSYTX W PT 30 MINUTES: CPT | Mod: 95

## 2024-06-05 DIAGNOSIS — F43.10 POST-TRAUMATIC STRESS DISORDER, UNSPECIFIED: ICD-10-CM

## 2024-06-05 NOTE — PLAN
[Cognitive and/or Behavior Therapy] : Cognitive and/or Behavior Therapy  [Skills training (all types)] : Skills training (all types)  [Supportive Therapy] : Supportive Therapy [FreeTextEntry2] : Goal #1- Management of depression  Obj #1- The patient will identify three triggers for his depression and will verbalize and utilize three effective coping mechanisms  Obj #2-  The patient will attend medication management appointments and will take his psychotropic medications as prescribed   Goal #2- Management of anxiety  Obj #2-  The patient will verbalize three triggers for anxiety and will verbalize and utilize at least three effective coping mechanisms for anxiety [de-identified] : The patient attended his scheduled session with the writer via [employer]. The patient an increase of nightmares along with ongoing anxiety and depression. Patient explained that he feels that his psychotropic medication is ineffective in managing his symptoms. Writer informed the patient of his psychiatrist's temporary leave of absence, and any medication adjustments can be completed prior to her leave. Patient explained that he has been trying to get things accomplished however, every time he ties, he does not feel motivated to accomplish anything. The writer explored ways that the patient can get himself motivated including setting an alarm to do small activities for small amounts of time. Ongoing encouragement was encouraged in the engagement of activities/ behavioral activation during the day. The patient reports medication adherence. The writer will continue to provide the patient with support and encouragement where needed. The patient is scheduled for a follow up appointment on 6/11/2024 at 12pm via telehealth per the patient's request.  [Recommended Frequency of Visits: ____] : Recommended frequency of visits: [unfilled] [Return in ____ week(s)] : Return in [unfilled] week(s)

## 2024-06-05 NOTE — REASON FOR VISIT
[Patient preference] : as per patient preference [Continuing, patient not seen in-person within last 12 months (provide details below)] : Telehealth services are continuing, patient not seen in-person within last 12 months.  [Telehealth (audio & video) - Individual/Group] : This visit was provided via telehealth using real-time 2-way audio visual technology. [Other Location: e.g. Home (Enter Location, City,State)___] : The provider was located at [unfilled]. [Home] : The patient, [unfilled], was located at home, [unfilled], at the time of the visit. [Verbal consent obtained from patient/other participant(s)] : Verbal consent for telehealth/telephonic services obtained from patient/other participant(s) [Patient] : Patient [FreeTextEntry4] : 11am  [FreeTextEntry5] : 11:21am  [FreeTextEntry3] : the patient requested telehealth sessions and is appropriate for telehealth services  [FreeTextEntry1] : psychotherapy follow up

## 2024-06-11 ENCOUNTER — OUTPATIENT (OUTPATIENT)
Dept: OUTPATIENT SERVICES | Facility: HOSPITAL | Age: 49
LOS: 1 days | End: 2024-06-11
Payer: COMMERCIAL

## 2024-06-11 ENCOUNTER — APPOINTMENT (OUTPATIENT)
Dept: PSYCHIATRY | Facility: CLINIC | Age: 49
End: 2024-06-11

## 2024-06-11 DIAGNOSIS — F43.10 POST-TRAUMATIC STRESS DISORDER, UNSPECIFIED: ICD-10-CM

## 2024-06-11 PROCEDURE — 90832 PSYTX W PT 30 MINUTES: CPT | Mod: 95

## 2024-06-12 DIAGNOSIS — F43.10 POST-TRAUMATIC STRESS DISORDER, UNSPECIFIED: ICD-10-CM

## 2024-06-12 NOTE — REASON FOR VISIT
[Patient preference] : as per patient preference [Continuing, patient not seen in-person within last 12 months (provide details below)] : Telehealth services are continuing, patient not seen in-person within last 12 months.  [Telehealth (audio & video) - Individual/Group] : This visit was provided via telehealth using real-time 2-way audio visual technology. [Other Location: e.g. Home (Enter Location, City,State)___] : The provider was located at [unfilled]. [Home] : The patient, [unfilled], was located at home, [unfilled], at the time of the visit. [Verbal consent obtained from patient/other participant(s)] : Verbal consent for telehealth/telephonic services obtained from patient/other participant(s) [FreeTextEntry4] : 12pm  [FreeTextEntry5] : 12:20pm  [FreeTextEntry3] : the patient requested telehealth sessions and is appropriate for telehealth services  [Patient] : Patient [FreeTextEntry1] : psychotherapy follow up

## 2024-06-12 NOTE — PLAN
[FreeTextEntry2] : Goal #1- Management of depression  Obj #1- The patient will identify three triggers for his depression and will verbalize and utilize three effective coping mechanisms  Obj #2-  The patient will attend medication management appointments and will take his psychotropic medications as prescribed   Goal #2- Management of anxiety  Obj #2-  The patient will verbalize three triggers for anxiety and will verbalize and utilize at least three effective coping mechanisms for anxiety [Cognitive and/or Behavior Therapy] : Cognitive and/or Behavior Therapy  [Skills training (all types)] : Skills training (all types)  [Supportive Therapy] : Supportive Therapy [de-identified] : The patient attended his scheduled session with the writer via [employer]. The writer commenced the session by inquiring what positive changes that the patient has made since his last session. The patient explained that he was able to go out on a walk in the early morning hours prior to the crowds. The patient explained that he has also made the step of calling an  to inquire about obtaining social security based off of his mental health. The writer commended the patient for these positive steps. Patient endorsed that he continues to have periods of depression and anxiety. Patient explained that he often feels lonely as his wife is not home due to caregiving responsibilities. The patient noted that this has caused some distance in his marriage, and this has caused some anger. The writer actively listened to the patient, validated his feelings and support was rendered. The writer and the patient discussed the reality of the situation as it is now. The patient reports medication adherence. The writer will continue to provide the patient with support and encouragement where needed. The patient is scheduled for a follow up appointment on 6/21/2024 at 12pm via telehealth per the patient's request. [Recommended Frequency of Visits: ____] : Recommended frequency of visits: [unfilled] [Return in ____ week(s)] : Return in [unfilled] week(s)

## 2024-06-21 ENCOUNTER — OUTPATIENT (OUTPATIENT)
Dept: OUTPATIENT SERVICES | Facility: HOSPITAL | Age: 49
LOS: 1 days | End: 2024-06-21
Payer: COMMERCIAL

## 2024-06-21 ENCOUNTER — APPOINTMENT (OUTPATIENT)
Dept: PSYCHIATRY | Facility: CLINIC | Age: 49
End: 2024-06-21

## 2024-06-21 DIAGNOSIS — F43.10 POST-TRAUMATIC STRESS DISORDER, UNSPECIFIED: ICD-10-CM

## 2024-06-21 PROCEDURE — 90832 PSYTX W PT 30 MINUTES: CPT

## 2024-06-21 NOTE — PLAN
[FreeTextEntry2] : Goal #1- Management of depression  Obj #1- The patient will identify three triggers for his depression and will verbalize and utilize three effective coping mechanisms  Obj #2-  The patient will attend medication management appointments and will take his psychotropic medications as prescribed   Goal #2- Management of anxiety  Obj #2-  The patient will verbalize three triggers for anxiety and will verbalize and utilize at least three effective coping mechanisms for anxiety [Cognitive and/or Behavior Therapy] : Cognitive and/or Behavior Therapy  [Skills training (all types)] : Skills training (all types)  [Supportive Therapy] : Supportive Therapy [de-identified] : The patient attended his scheduled session with the writer via [employer]. The patient reported no changes to his anxiety and depression. The patient explained that he  has been tired and has been having little to no energy. Ongoing discussion were had with the patient surrounding creating and adhering to a daily routine now that he is not working. Patient expressed that there are multiple areas in his home that need to  organized however, he does not have the motivation and or the energy to do much during the day.  The patient noted that he went to see an  to get advise on how he should proceed with applying for social security benefits. The patient reports medication adherence. The writer will continue to provide the patient with support and encouragement where needed. The patient is schedued for a follow up appointment on 7/2/2024 at 12pm via telehealth per the patient's request.  [Recommended Frequency of Visits: ____] : Recommended frequency of visits: [unfilled] [Return in ____ week(s)] : Return in [unfilled] week(s)

## 2024-06-22 DIAGNOSIS — F43.10 POST-TRAUMATIC STRESS DISORDER, UNSPECIFIED: ICD-10-CM

## 2024-07-02 ENCOUNTER — APPOINTMENT (OUTPATIENT)
Dept: PSYCHIATRY | Facility: CLINIC | Age: 49
End: 2024-07-02

## 2024-07-02 ENCOUNTER — OUTPATIENT (OUTPATIENT)
Dept: OUTPATIENT SERVICES | Facility: HOSPITAL | Age: 49
LOS: 1 days | End: 2024-07-02
Payer: COMMERCIAL

## 2024-07-02 DIAGNOSIS — F43.10 POST-TRAUMATIC STRESS DISORDER, UNSPECIFIED: ICD-10-CM

## 2024-07-02 PROCEDURE — 90832 PSYTX W PT 30 MINUTES: CPT | Mod: 95

## 2024-07-03 DIAGNOSIS — F43.10 POST-TRAUMATIC STRESS DISORDER, UNSPECIFIED: ICD-10-CM

## 2024-07-09 ENCOUNTER — OUTPATIENT (OUTPATIENT)
Dept: OUTPATIENT SERVICES | Facility: HOSPITAL | Age: 49
LOS: 1 days | End: 2024-07-09
Payer: COMMERCIAL

## 2024-07-09 ENCOUNTER — APPOINTMENT (OUTPATIENT)
Dept: PSYCHIATRY | Facility: CLINIC | Age: 49
End: 2024-07-09

## 2024-07-09 DIAGNOSIS — F43.10 POST-TRAUMATIC STRESS DISORDER, UNSPECIFIED: ICD-10-CM

## 2024-07-09 PROCEDURE — 90832 PSYTX W PT 30 MINUTES: CPT | Mod: 95

## 2024-07-10 DIAGNOSIS — F43.10 POST-TRAUMATIC STRESS DISORDER, UNSPECIFIED: ICD-10-CM

## 2024-07-15 ENCOUNTER — OUTPATIENT (OUTPATIENT)
Dept: OUTPATIENT SERVICES | Facility: HOSPITAL | Age: 49
LOS: 1 days | End: 2024-07-15
Payer: COMMERCIAL

## 2024-07-15 ENCOUNTER — APPOINTMENT (OUTPATIENT)
Dept: PSYCHIATRY | Facility: CLINIC | Age: 49
End: 2024-07-15

## 2024-07-15 DIAGNOSIS — F43.10 POST-TRAUMATIC STRESS DISORDER, UNSPECIFIED: ICD-10-CM

## 2024-07-15 PROCEDURE — 90832 PSYTX W PT 30 MINUTES: CPT | Mod: 95

## 2024-07-16 DIAGNOSIS — F43.10 POST-TRAUMATIC STRESS DISORDER, UNSPECIFIED: ICD-10-CM

## 2024-07-26 ENCOUNTER — APPOINTMENT (OUTPATIENT)
Dept: PSYCHIATRY | Facility: CLINIC | Age: 49
End: 2024-07-26

## 2024-07-26 NOTE — PLAN
[FreeTextEntry2] : Goal #1- Management of depression  Obj #1- The patient will identify three triggers for his depression and will verbalize and utilize three effective coping mechanisms  Obj #2-  The patient will attend medication management appointments and will take his psychotropic medications as prescribed   Goal #2- Management of anxiety  Obj #2-  The patient will verbalize three triggers for anxiety and will verbalize and utilize at least three effective coping mechanisms for anxiety [Cognitive and/or Behavior Therapy] : Cognitive and/or Behavior Therapy  [Skills training (all types)] : Skills training (all types)  [Supportive Therapy] : Supportive Therapy [de-identified] : The patient attended his scheduled session with the writer via [employer]. The patient reported no changes to his anxiety and depression. The patient explained that he has begun to "panic" when having to make decisions independently. The patient explained that he has begun relying on his wife to make most of the decisions in the home. The patient noted that he has begun to get frustrated within himself as he has always been "the problem" solver. The writer actively listened to the patient, validated his feelings and support was rendered. The writer and the patient discussed positive self talk along with identifying what has been triggering these emotions. The patient explained that he has been trying to declutter his home and he was able to get some projects completed around the home. The writer commended the patient. This led to a discussion surrounding how having a home that he sees as decluttered could help boost his motivation. The patient reports medication adherence. The writer will continue to provide the patient with support and encouragement where needed. The patient is scheduled for a follow up appointment  on 8/19/2024 at 12pm via telehealth per the patient's request. *The patient will be seen when the writer returns from vacation.  [Recommended Frequency of Visits: ____] : Recommended frequency of visits: [unfilled] [Return in ____ week(s)] : Return in [unfilled] week(s)

## 2024-07-26 NOTE — PLAN
[FreeTextEntry2] : Goal #1- Management of depression  Obj #1- The patient will identify three triggers for his depression and will verbalize and utilize three effective coping mechanisms  Obj #2-  The patient will attend medication management appointments and will take his psychotropic medications as prescribed   Goal #2- Management of anxiety  Obj #2-  The patient will verbalize three triggers for anxiety and will verbalize and utilize at least three effective coping mechanisms for anxiety [Cognitive and/or Behavior Therapy] : Cognitive and/or Behavior Therapy  [Skills training (all types)] : Skills training (all types)  [Supportive Therapy] : Supportive Therapy [de-identified] : The patient attended his scheduled session with the writer via "[employer]". The patient reported no changes to his anxiety and depression. The patient explained that he has begun to "panic" when having to make decisions independently. The patient explained that he has begun relying on his wife to make most of the decisions in the home. The patient noted that he has begun to get frustrated within himself as he has always been "the problem" solver. The writer actively listened to the patient, validated his feelings and support was rendered. The writer and the patient discussed positive self talk along with identifying what has been triggering these emotions. The patient explained that he has been trying to declutter his home and he was able to get some projects completed around the home. The writer commended the patient. This led to a discussion surrounding how having a home that he sees as decluttered could help boost his motivation. The patient reports medication adherence. The writer will continue to provide the patient with support and encouragement where needed. The patient is scheduled for a follow up appointment  on 8/19/2024 at 12pm via telehealth per the patient's request. *The patient will be seen when the writer returns from vacation.  [Recommended Frequency of Visits: ____] : Recommended frequency of visits: [unfilled] [Return in ____ week(s)] : Return in [unfilled] week(s)

## 2024-07-26 NOTE — REASON FOR VISIT
[Patient preference] : as per patient preference [Continuing, patient not seen in-person within last 12 months (provide details below)] : Telehealth services are continuing, patient not seen in-person within last 12 months.  [Telehealth (audio & video) - Individual/Group] : This visit was provided via telehealth using real-time 2-way audio visual technology. [Other Location: e.g. Home (Enter Location, City,State)___] : The provider was located at [unfilled]. [Home] : The patient, [unfilled], was located at home, [unfilled], at the time of the visit. [Verbal consent obtained from patient/other participant(s)] : Verbal consent for telehealth/telephonic services obtained from patient/other participant(s) [FreeTextEntry4] : 12pm  [FreeTextEntry5] : 12:30pm  [FreeTextEntry3] : the patient requested telehealth sessions and is appropriate for telehealth services  [Patient] : Patient [FreeTextEntry1] : psychotherapy follow up

## 2024-08-12 ENCOUNTER — APPOINTMENT (OUTPATIENT)
Dept: PSYCHIATRY | Facility: CLINIC | Age: 49
End: 2024-08-12
Payer: COMMERCIAL

## 2024-08-12 PROCEDURE — 99214 OFFICE O/P EST MOD 30 MIN: CPT | Mod: 95

## 2024-08-12 NOTE — HISTORY OF PRESENT ILLNESS
[FreeTextEntry1] : Pt seen and evaluated. Pt reports some stressors of an old coworker passing away this month. Pt reports he has processed this loss with his therapist. Pt reports he is continuing to incorporate more structure into his daily routine and tackle various projects such as cleaning/decluttering his home.  Pt denies any active or passive SI/HI/AVH at this time. Pt reports benefit and compliance with medication regimen.   [FreeTextEntry2] : see above [FreeTextEntry3] : none

## 2024-08-12 NOTE — SURGICAL HISTORY
[FreeTextEntry1] : The patient reports that he has had bunion surgery between [id number]. Patient reports having chronic foot pain.

## 2024-08-12 NOTE — PHYSICAL EXAM
[None] : none [Average] : average [Intermittent] : intermittent [Slowed] : slowed [Cooperative] : cooperative [Depressed] : depressed [Anxious] : anxious [Constricted] : constricted [Clear] : clear [Linear/Goal Directed] : linear/goal directed [WNL] : within normal limits [None Reported] : none reported [Mild] : mild [Moderate] : moderate [FreeTextEntry8] : less depressed [de-identified] : poor

## 2024-08-12 NOTE — REASON FOR VISIT
[Telehealth (audio & video) - Individual/Group] : This visit was provided via telehealth using real-time 2-way audio visual technology. [Home] : The patient, [unfilled], was located at home, [unfilled], at the time of the visit. [Verbal consent obtained from patient/other participant(s)] : Verbal consent for telehealth/telephonic services obtained from patient/other participant(s) [OK  to leave message] : OK  to leave message [Community Based Organization] : Community Based Organization [Non-[facility] Provider/Facility] : Non-[facility] Provider/Facility [Patient] : Patient [Collateral - Name/Contact Info/Relationship:___] : Collateral: [unfilled] [FreeTextEntry5] : [place]  [FreeTextEntry6] : Maxime  [FreeTextEntry7] : he/him/his [FreeTextEntry2] : evaluation for anxiety,depression and PTSD related to 9/11 exposure  [FreeTextEntry1] : "Since I was certified I have PTSD. I have nightmares sometimes. I have more anxiety" \par   18-Mar-2023 14:35

## 2024-08-12 NOTE — SOCIAL HISTORY
[FreeTextEntry1] : Legal Status \par  Does individual Served have a Legal Guardian, rep Payee or Conservatorship?\par  [ X] No\par  [ ] Yes - Details: [ ] \par  \par  Legal Involvement history \par  Does the individual have a history of or current involvement with the legal system? \par  [ X] No\par  [ ] Yes - Details: [ ] \par  \par   Services \par  Have you ever served in the ? \par  [X ] No\par  [ ] Yes - Details: [ ] \par  \par  Employment history: Patient is currently a . 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 Patient reports that he is a naturalized citizen. \par  \par  \par  Social supports (friends, Volunteers, club, AA meeting, other meetings ) ? Patient reports that his wife, Joyce is his only social support. \par  \par  Meaningful Activities: video games\par  \par  \par  Spiritual Assessment Tool - FICA\par  F. What is your carmen or belief? Scientologist\par  Do you consider yourself spiritual or Mandaeism? No \par  Is there something you believe in that gives meaning to your life? Joyce my wife \par  I: Is it important in your life? Yes \par  What influence does it have on how you take care of yourself? "I want to be a better person overall" \par  How have your beliefs influenced your behavior during this illness? What role do your beliefs play in regaining your health?  "I follow the teachings of Buddha" \par  C. Are you part of a spiritual or Mandaeism community? No \par  Is this of support to you and how? N/A \par  Is there a person or group of people you really love or who are really important to you? Joyce , mom and aunt \par  H. We have been discussing your belief and supports. What else gives you internal support? No\par  What are your sources of hope, strength, comfort and peace? "being home in my safe place" \par  What do you hold on to during difficult times? No \par  what sustains you and keeps you going? My wife \par  A. How would you like me, your healthcare provider, to address these issues in your healthcare? Talk about it \par  \par  SMOKING CESSATION \par  Do you Smoke?                              [ X] Yes		[ ] No\par  Do you want to quit? 		[ ] Yes		[X ] No\par  -ASK\par  	Number of cigarettes   [ ] cigars [ ]  pipe bowls [20 ]  per day \par  	Number of ST cans/ pouches per week [ 7]\par  	Number of years used [ over 30 years ]\par  	How soon after you wake up do you use tobacco?  [X ] within 30 minutes      [ ] more than 30 minutes \par  	Previous quit attempts:  # of attempts [ ] longest quit period [ ] methods(s) used [ ]- Never tried to quit smoking\par  How long ago was last attempt to quit  [ ] years [ ] months- NA \par  	Reasons for wanting to quit[ ] Patient reports that he does not want to quit smoking \par  -ADVISE   about the oral benefits of quitting\par  -ASSESS   willingness to make a quit attempt (Stage of Change) \par  	    [ ] Precontemplation (Stop here & Reassess next visit)	[ ] Contemplation [ ] Preparation  \par  -ASSIST    (depending on stage of change) \par  	Self-Help Pamphlets & Materials\par  	List of local community group/individual quit programs and phone help lines\par  	Encourage a quit date (for those who are ready)\par  	Pharmacotherapy: Nicotine gum/ Lozenge/ Patch/ Inhaler/NS/Zyban/ Chantix\par   	RX: 	[ ]  () \par  -ARRANGE  Follow up if set a quit date (with permission)\par  Quit Date: [ ]  Phone calls or visits:  [ ] Week 1-2  Months   [ ] 1   [ ] 3   [ ] 6   [ ] 12  \par  -Yearly Reassessment    [ ] No Changes of Smoking [ ] Change of Smoking Habit (Non-Smoker to Smoker/ Smoker to Non Smoker)\par  (If there is change from Non Smoker to Smoker, please fill out new BRIEF TOBACCO CESSATION INTERVENTION FORM) \par  Date of Yearly Reassessment : [ ]\par  Comments:  [ ]\par  \par

## 2024-08-12 NOTE — DISCUSSION/SUMMARY
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 Pt denies any mental health conditions prior to 9/11), no prior IPP, no SA, never on meds, c/o feeling depressed, having anhedonia, apathy, agoraphobia, flashbacks, hypervigilance, etc. Pt was Dxd with PTSD by 9/11 fund and referred to us for treatment. Denies MELINA.  Pt denies any acute issues. Pt is working on cleaning his home. Continue current med regimen due to ongoing benefit on mood symptoms.   1. Next appt in 1  month 2. continue Prozac 60mg qdaily 3. Advised pt to follow up with medical doctor regarding sleep apnea and re-fitted for CPAP machine 4. continue Trazodone 150mg qhs PRN for insomnia 5. Pt has reduced caffeine intake 6. continue Prazosin to 2mg qhs for nightmares

## 2024-08-12 NOTE — RISK ASSESSMENT
[No, patient denies ideation or behavior] : No, patient denies ideation or behavior [Clinical Interview] : Clinical Interview [No] : No [Mood disorder] : mood disorder [PTSD] : PTSD [Identifies reasons for living] : identifies reasons for living [Supportive social network of family or friends] : supportive social network of family or friends [Yarsani beliefs] : Advent beliefs [Cultural, spiritual and/or moral attitudes against suicide] : cultural, spiritual and/or moral attitudes against suicide [Positive therapeutic relationships] : positive therapeutic relationships [None in the patient's lifetime] : None in the patient's lifetime [None Known] : none known [No known risk factors] : No known risk factors [Residential stability] : residential stability [Employment stability] : employment stability [Sobriety] : sobriety [Yes] : yes [de-identified] : the patient reports that he owns firearms that is stored away safely

## 2024-08-12 NOTE — FAMILY HISTORY
[FreeTextEntry1] : Family composition: wife, mom, aunt and one cousin in [place] only good relationship with mom. Father passed away in 2008. \par  Family history and background: Patient identifies as Chinese American\par  Family relationship: Patient reports that he has a good relationship with is family\par  Pertinent Family Medical, MH and Substance Use History including Adult Child of Alcoholic and child of substance abuse status; history of cancer and heart disease: Patient reports having hypertension, no history of substance use, the patient denied family history of substance use, breast cancer on mothers side and thyroid cancer.\par

## 2024-08-19 ENCOUNTER — OUTPATIENT (OUTPATIENT)
Dept: OUTPATIENT SERVICES | Facility: HOSPITAL | Age: 49
LOS: 1 days | End: 2024-08-19
Payer: COMMERCIAL

## 2024-08-19 ENCOUNTER — APPOINTMENT (OUTPATIENT)
Dept: PSYCHIATRY | Facility: CLINIC | Age: 49
End: 2024-08-19

## 2024-08-19 DIAGNOSIS — F43.10 POST-TRAUMATIC STRESS DISORDER, UNSPECIFIED: ICD-10-CM

## 2024-08-19 PROCEDURE — 90832 PSYTX W PT 30 MINUTES: CPT

## 2024-08-20 DIAGNOSIS — F43.10 POST-TRAUMATIC STRESS DISORDER, UNSPECIFIED: ICD-10-CM

## 2024-08-20 NOTE — REASON FOR VISIT
[Patient preference] : as per patient preference [Continuing, patient not seen in-person within last 12 months (provide details below)] : Telehealth services are continuing, patient not seen in-person within last 12 months.  [Telehealth (audio & video) - Individual/Group] : This visit was provided via telehealth using real-time 2-way audio visual technology. [Other Location: e.g. Home (Enter Location, City,State)___] : The provider was located at [unfilled]. [Home] : The patient, [unfilled], was located at home, [unfilled], at the time of the visit. [Verbal consent obtained from patient/other participant(s)] : Verbal consent for telehealth/telephonic services obtained from patient/other participant(s) [Patient] : Patient [FreeTextEntry4] : 12pm  [FreeTextEntry5] : 12:30pm  [FreeTextEntry3] : the patient requested telehealth sessions and is appropriate for telehealth services  [FreeTextEntry1] : psychotherapy follow up

## 2024-08-20 NOTE — PLAN
[Cognitive and/or Behavior Therapy] : Cognitive and/or Behavior Therapy  [Skills training (all types)] : Skills training (all types)  [Supportive Therapy] : Supportive Therapy [FreeTextEntry2] : Goal #1- Management of depression  Obj #1- The patient will identify three triggers for his depression and will verbalize and utilize three effective coping mechanisms  Obj #2-  The patient will attend medication management appointments and will take his psychotropic medications as prescribed   Goal #2- Management of anxiety  Obj #2-  The patient will verbalize three triggers for anxiety and will verbalize and utilize at least three effective coping mechanisms for anxiety [de-identified] : The patient attended his scheduled session with the writer via [employer]. The patient noted that there have not been any significant changes to his mental health and he continues to feel anxious and depressed. The writer and the patient discussed the routine that the patient has been adhering to and he explained that he has remained in the home as "noise annoys me". The patient explained that he has been receiving phone calls from friends however, he noted that he is uninterested in speaking with them due to not wanting to tell them "about my situation". The writer and the patient discussed how speaking to other people about what he is going through may render positive support. Ongoing discussion surrounding behavioral activation was encouraged as the patient explained that most of his days are spent indoors. The patient reports medication adherence. The writer will continue to provide the patient with support and encouragement where needed. The patient is scheduled for a follow up appointment on 8/27/2024 at 12pm via telehealth per the patient's request.  [Recommended Frequency of Visits: ____] : Recommended frequency of visits: [unfilled] [Return in ____ week(s)] : Return in [unfilled] week(s)

## 2024-08-20 NOTE — PLAN
[Cognitive and/or Behavior Therapy] : Cognitive and/or Behavior Therapy  [Skills training (all types)] : Skills training (all types)  [Supportive Therapy] : Supportive Therapy [FreeTextEntry2] : Goal #1- Management of depression  Obj #1- The patient will identify three triggers for his depression and will verbalize and utilize three effective coping mechanisms  Obj #2-  The patient will attend medication management appointments and will take his psychotropic medications as prescribed   Goal #2- Management of anxiety  Obj #2-  The patient will verbalize three triggers for anxiety and will verbalize and utilize at least three effective coping mechanisms for anxiety [de-identified] : The patient attended his scheduled session with the writer via [employer]. The patient noted that there have not been any significant changes to his mental health and he continues to feel anxious and depressed. The writer and the patient discussed the routine that the patient has been adhering to and he explained that he has remained in the home as "noise annoys me". The patient explained that he has been receiving phone calls from friends however, he noted that he is uninterested in speaking with them due to not wanting to tell them "about my situation". The writer and the patient discussed how speaking to other people about what he is going through may render positive support. Ongoing discussion surrounding behavioral activation was encouraged as the patient explained that most of his days are spent indoors. The patient reports medication adherence. The writer will continue to provide the patient with support and encouragement where needed. The patient is scheduled for a follow up appointment on 8/27/2024 at 12pm via telehealth per the patient's request.  [Recommended Frequency of Visits: ____] : Recommended frequency of visits: [unfilled] [Return in ____ week(s)] : Return in [unfilled] week(s)

## 2024-08-20 NOTE — PLAN
[Cognitive and/or Behavior Therapy] : Cognitive and/or Behavior Therapy  [Skills training (all types)] : Skills training (all types)  [Supportive Therapy] : Supportive Therapy [FreeTextEntry2] : Goal #1- Management of depression  Obj #1- The patient will identify three triggers for his depression and will verbalize and utilize three effective coping mechanisms  Obj #2-  The patient will attend medication management appointments and will take his psychotropic medications as prescribed   Goal #2- Management of anxiety  Obj #2-  The patient will verbalize three triggers for anxiety and will verbalize and utilize at least three effective coping mechanisms for anxiety [de-identified] : The patient attended his scheduled session with the writer via [employer] The patient noted that there have not been any significant changes to his mental health and he continues to feel anxious and depressed. The writer and the patient discussed the routine that the patient has been adhering to and he explained that he has remained in the home as "noise annoys me". The patient explained that he has been receiving phone calls from friends however, he noted that he is uninterested in speaking with them due to not wanting to tell them "about my situation". The writer and the patient discussed how speaking to other people about what he is going through may render positive support. Ongoing discussion surrounding behavioral activation was encouraged as the patient explained that most of his days are spent indoors. The patient reports medication adherence. The writer will continue to provide the patient with support and encouragement where needed. The patient is scheduled for a follow up appointment on 8/27/2024 at 12pm via telehealth per the patient's request.  [Recommended Frequency of Visits: ____] : Recommended frequency of visits: [unfilled] [Return in ____ week(s)] : Return in [unfilled] week(s)

## 2024-08-27 ENCOUNTER — APPOINTMENT (OUTPATIENT)
Dept: PSYCHIATRY | Facility: CLINIC | Age: 49
End: 2024-08-27

## 2024-08-27 ENCOUNTER — OUTPATIENT (OUTPATIENT)
Dept: OUTPATIENT SERVICES | Facility: HOSPITAL | Age: 49
LOS: 1 days | End: 2024-08-27
Payer: COMMERCIAL

## 2024-08-27 DIAGNOSIS — F43.10 POST-TRAUMATIC STRESS DISORDER, UNSPECIFIED: ICD-10-CM

## 2024-08-27 PROCEDURE — 90832 PSYTX W PT 30 MINUTES: CPT

## 2024-08-27 NOTE — PLAN
[FreeTextEntry2] : Goal #1- Management of depression  Obj #1- The patient will identify three triggers for his depression and will verbalize and utilize three effective coping mechanisms  Obj #2-  The patient will attend medication management appointments and will take his psychotropic medications as prescribed   Goal #2- Management of anxiety  Obj #2-  The patient will verbalize three triggers for anxiety and will verbalize and utilize at least three effective coping mechanisms for anxiety [Cognitive and/or Behavior Therapy] : Cognitive and/or Behavior Therapy  [Skills training (all types)] : Skills training (all types)  [Supportive Therapy] : Supportive Therapy [de-identified] : The patient attended his scheduled session with the writer via [employer]. The patient explained that there have been little changes regarding his anxiety and depression. The patient noted that he however, was able to make progress on a section in his apartment that he was able to get straightened up. The writer commended the patient. The patient noted that he has ways to go in the cleaning and organizing of his apartment. The writer and the patient discussed dividing the task that he needs to complete into more manageable parts. The patient cited that he has been feeling unmotivated, tired and overwhelmed with the tasks. The patient explained that he has not been wanting to answer the phone and he has been avoiding socializing with others. This led to a discussion surrounding how potentially connecting with others could make him feel less isolated from others. The patient reports medication adherence. The writer will continue to provide the patient with support and encouragement where needed. The patient is scheduled for a follow up appointment on 9/4/2024 at 11:30am via telehealth per the patient's request.  [Recommended Frequency of Visits: ____] : Recommended frequency of visits: [unfilled] [Return in ____ week(s)] : Return in [unfilled] week(s)

## 2024-08-27 NOTE — PLAN
[FreeTextEntry2] : Goal #1- Management of depression  Obj #1- The patient will identify three triggers for his depression and will verbalize and utilize three effective coping mechanisms  Obj #2-  The patient will attend medication management appointments and will take his psychotropic medications as prescribed   Goal #2- Management of anxiety  Obj #2-  The patient will verbalize three triggers for anxiety and will verbalize and utilize at least three effective coping mechanisms for anxiety [Cognitive and/or Behavior Therapy] : Cognitive and/or Behavior Therapy  [Skills training (all types)] : Skills training (all types)  [Supportive Therapy] : Supportive Therapy [de-identified] : The patient attended his scheduled session with the writer via [employer]. The patient explained that there have been little changes regarding his anxiety and depression. The patient noted that he however, was able to make progress on a section in his apartment that he was able to get straightened up. The writer commended the patient. The patient noted that he has ways to go in the cleaning and organizing of his apartment. The writer and the patient discussed dividing the task that he needs to complete into more manageable parts. The patient cited that he has been feeling unmotivated, tired and overwhelmed with the tasks. The patient explained that he has not been wanting to answer the phone and he has been avoiding socializing with others. This led to a discussion surrounding how potentially connecting with others could make him feel less isolated from others. The patient reports medication adherence. The writer will continue to provide the patient with support and encouragement where needed. The patient is scheduled for a follow up appointment on 9/4/2024 at 11:30am via telehealth per the patient's request.  [Recommended Frequency of Visits: ____] : Recommended frequency of visits: [unfilled] [Return in ____ week(s)] : Return in [unfilled] week(s)

## 2024-08-28 DIAGNOSIS — F43.10 POST-TRAUMATIC STRESS DISORDER, UNSPECIFIED: ICD-10-CM

## 2024-09-04 ENCOUNTER — OUTPATIENT (OUTPATIENT)
Dept: OUTPATIENT SERVICES | Facility: HOSPITAL | Age: 49
LOS: 1 days | End: 2024-09-04
Payer: COMMERCIAL

## 2024-09-04 ENCOUNTER — APPOINTMENT (OUTPATIENT)
Dept: PSYCHIATRY | Facility: CLINIC | Age: 49
End: 2024-09-04

## 2024-09-04 DIAGNOSIS — F43.10 POST-TRAUMATIC STRESS DISORDER, UNSPECIFIED: ICD-10-CM

## 2024-09-04 PROCEDURE — 90832 PSYTX W PT 30 MINUTES: CPT

## 2024-09-04 NOTE — PLAN
[FreeTextEntry2] : Goal #1- Management of depression  Obj #1- The patient will identify three triggers for his depression and will verbalize and utilize three effective coping mechanisms  Obj #2-  The patient will attend medication management appointments and will take his psychotropic medications as prescribed   Goal #2- Management of anxiety  Obj #2-  The patient will verbalize three triggers for anxiety and will verbalize and utilize at least three effective coping mechanisms for anxiety [Cognitive and/or Behavior Therapy] : Cognitive and/or Behavior Therapy  [Skills training (all types)] : Skills training (all types)  [Supportive Therapy] : Supportive Therapy [de-identified] : The patient attended his scheduled session with the writer via [employer]. The patient explained that he has been doing "a little better". The writer explored this with the patient further and he noted that he has been working diligently to declutter his home and he has also been doing tasks around the home such as fixing his sink. The writer commended the patient. The patient explained that keeping himself busy has been helpful with his anxiety and depression. The patient notes that he continues to self isolate himself and he does not have any desire to speak to his friends and or family members. The patient explained that in the past, he was able to speak with his coworker who also was going through a depressive episode and he found the conversation to be helpful. The writer and the patient discussed how communicating again with his friends regarding what he has bee going through can help to reestablish rapport with his friends and can also provide a healthy outlet for him to verbalize emotions. The patient reports medication adherence. The writer will continue to provide the patient with support and encouragement where needed. The patient is scheduled for a follow up appointment on 9/13/2024 at 12pm via telehealth per the patient's request.  [Recommended Frequency of Visits: ____] : Recommended frequency of visits: [unfilled] [Return in ____ week(s)] : Return in [unfilled] week(s)

## 2024-09-04 NOTE — PLAN
[FreeTextEntry2] : Goal #1- Management of depression  Obj #1- The patient will identify three triggers for his depression and will verbalize and utilize three effective coping mechanisms  Obj #2-  The patient will attend medication management appointments and will take his psychotropic medications as prescribed   Goal #2- Management of anxiety  Obj #2-  The patient will verbalize three triggers for anxiety and will verbalize and utilize at least three effective coping mechanisms for anxiety [Cognitive and/or Behavior Therapy] : Cognitive and/or Behavior Therapy  [Skills training (all types)] : Skills training (all types)  [Supportive Therapy] : Supportive Therapy [de-identified] : The patient attended his scheduled session with the writer via [employer]. The patient explained that he has been doing "a little better". The writer explored this with the patient further and he noted that he has been working diligently to declutter his home and he has also been doing tasks around the home such as fixing his sink. The writer commended the patient. The patient explained that keeping himself busy has been helpful with his anxiety and depression. The patient notes that he continues to self isolate himself and he does not have any desire to speak to his friends and or family members. The patient explained that in the past, he was able to speak with his coworker who also was going through a depressive episode and he found the conversation to be helpful. The writer and the patient discussed how communicating again with his friends regarding what he has bee going through can help to reestablish rapport with his friends and can also provide a healthy outlet for him to verbalize emotions. The patient reports medication adherence. The writer will continue to provide the patient with support and encouragement where needed. The patient is scheduled for a follow up appointment on 9/13/2024 at 12pm via telehealth per the patient's request.  [Recommended Frequency of Visits: ____] : Recommended frequency of visits: [unfilled] [Return in ____ week(s)] : Return in [unfilled] week(s)

## 2024-09-04 NOTE — REASON FOR VISIT
[Patient preference] : as per patient preference [Continuing, patient not seen in-person within last 12 months (provide details below)] : Telehealth services are continuing, patient not seen in-person within last 12 months.  [Telehealth (audio & video) - Individual/Group] : This visit was provided via telehealth using real-time 2-way audio visual technology. [Medical Office: ([facility])___] : The provider was located at the medical office in [unfilled]. [Home] : The patient, [unfilled], was located at home, [unfilled], at the time of the visit. [Verbal consent obtained from patient/other participant(s)] : Verbal consent for telehealth/telephonic services obtained from patient/other participant(s) [FreeTextEntry4] : 11:35am  [FreeTextEntry5] : 12pm [FreeTextEntry3] : the patient requested telehealth sessions and is appropriate for telehealth services   [Patient] : Patient [FreeTextEntry1] : psychotherapy follow up

## 2024-09-04 NOTE — REASON FOR VISIT
[Patient preference] : as per patient preference [Continuing, patient not seen in-person within last 12 months (provide details below)] : Telehealth services are continuing, patient not seen in-person within last 12 months.  [Telehealth (audio & video) - Individual/Group] : This visit was provided via telehealth using real-time 2-way audio visual technology. [Medical Office: ([facility])___] : The provider was located at the medical office in [unfilled]. [Home] : The patient, [unfilled], was located at home, [unfilled], at the time of the visit. [Verbal consent obtained from patient/other participant(s)] : Verbal consent for telehealth/telephonic services obtained from patient/other participant(s) [FreeTextEntry4] : 11:35am  [FreeTextEntry5] : 12pm [FreeTextEntry3] : the patient requested telehealth sessions and is appropriate for telehealth services   [Patient] : Patient [FreeTextEntry1] : psychotherapy follow up

## 2024-09-05 DIAGNOSIS — F43.10 POST-TRAUMATIC STRESS DISORDER, UNSPECIFIED: ICD-10-CM

## 2024-09-09 ENCOUNTER — APPOINTMENT (OUTPATIENT)
Dept: PSYCHIATRY | Facility: CLINIC | Age: 49
End: 2024-09-09

## 2024-09-13 ENCOUNTER — APPOINTMENT (OUTPATIENT)
Dept: PSYCHIATRY | Facility: CLINIC | Age: 49
End: 2024-09-13

## 2024-09-26 ENCOUNTER — APPOINTMENT (OUTPATIENT)
Dept: PSYCHIATRY | Facility: CLINIC | Age: 49
End: 2024-09-26

## 2024-09-26 ENCOUNTER — OUTPATIENT (OUTPATIENT)
Dept: OUTPATIENT SERVICES | Facility: HOSPITAL | Age: 49
LOS: 1 days | End: 2024-09-26
Payer: COMMERCIAL

## 2024-09-26 DIAGNOSIS — F43.10 POST-TRAUMATIC STRESS DISORDER, UNSPECIFIED: ICD-10-CM

## 2024-09-26 PROCEDURE — 90832 PSYTX W PT 30 MINUTES: CPT

## 2024-09-26 NOTE — REASON FOR VISIT
[Patient preference] : as per patient preference [Continuing, patient not seen in-person within last 12 months (provide details below)] : Telehealth services are continuing, patient not seen in-person within last 12 months.  [Telehealth (audio & video) - Individual/Group] : This visit was provided via telehealth using real-time 2-way audio visual technology. [Medical Office: ([facility])___] : The provider was located at the medical office in [unfilled]. [Home] : The patient, [unfilled], was located at home, [unfilled], at the time of the visit. [Patient's space is appropriate for telehealth and maintains privacy/confidentiality.] : Patient's space is appropriate for telehealth and maintains privacy/confidentiality. [Participant(s) identity verified] : Participant(s) identity verified. [Patient] : Patient [FreeTextEntry4] : 11:30am  [FreeTextEntry5] : 12pm  [FreeTextEntry3] : the patient requested telehealth sessions and is appropriate for telehealth services  [FreeTextEntry1] : psychotherapy follow up

## 2024-09-26 NOTE — REASON FOR VISIT
[Patient preference] : as per patient preference [Continuing, patient not seen in-person within last 12 months (provide details below)] : Telehealth services are continuing, patient not seen in-person within last 12 months.  [Telehealth (audio & video) - Individual/Group] : This visit was provided via telehealth using real-time 2-way audio visual technology. [Medical Office: ([facility])___] : The provider was located at the medical office in [unfilled]. [Home] : The patient, [unfilled], was located at home, [unfilled], at the time of the visit. [Patient's space is appropriate for telehealth and maintains privacy/confidentiality.] : Patient's space is appropriate for telehealth and maintains privacy/confidentiality. [Participant(s) identity verified] : Participant(s) identity verified. [Patient] : Patient [FreeTextEntry4] : 11:30am  [FreeTextEntry5] : 12pm  [FreeTextEntry3] : the patient requested telehealth sessions and is appropriate for telehealth services  [FreeTextEntry1] : psychotherapy follow up

## 2024-09-26 NOTE — PLAN
[Cognitive and/or Behavior Therapy] : Cognitive and/or Behavior Therapy  [Skills training (all types)] : Skills training (all types)  [Supportive Therapy] : Supportive Therapy [FreeTextEntry2] : Goal #1- Management of depression  Obj #1- The patient will identify three triggers for his depression and will verbalize and utilize three effective coping mechanisms  Obj #2-  The patient will attend medication management appointments and will take his psychotropic medications as prescribed   Goal #2- Management of anxiety  Obj #2-  The patient will verbalize three triggers for anxiety and will verbalize and utilize at least three effective coping mechanisms for anxiety [de-identified] : The patient attended his scheduled session with the writer via [employer]. The patient explained that he has not had much change in regard to his mood and anxiety and depression. The patient noted that he has had an increase of anger. The writer explored this with the patient further and he noted that he is upset about his current situation. The patient noted that it is difficult for him to think about, and this makes him upset. The writer and the patient discussed the importance of discussing these emotions so that he can further process these feelings. Ongoing discussions were had with the patient regarding the establishment of a daily routine as he often finds himself being "bored". He explained that since he has been home, his cigarette smoking has decreased by about 25%. This led to a discussion about smoking cessation. The patient explained that he is uninterested in smoking cessation at this time. The writer will continue to provide the patient with support and encouragement where needed. The patient is scheduled for a follow up appointment on 10/3/2024 at 12pm via telehealth per the patient's request. [Recommended Frequency of Visits: ____] : Recommended frequency of visits: [unfilled] [Return in ____ week(s)] : Return in [unfilled] week(s)

## 2024-09-26 NOTE — PLAN
[Cognitive and/or Behavior Therapy] : Cognitive and/or Behavior Therapy  [Skills training (all types)] : Skills training (all types)  [Supportive Therapy] : Supportive Therapy [FreeTextEntry2] : Goal #1- Management of depression  Obj #1- The patient will identify three triggers for his depression and will verbalize and utilize three effective coping mechanisms  Obj #2-  The patient will attend medication management appointments and will take his psychotropic medications as prescribed   Goal #2- Management of anxiety  Obj #2-  The patient will verbalize three triggers for anxiety and will verbalize and utilize at least three effective coping mechanisms for anxiety [de-identified] : The patient attended his scheduled session with the writer via [employer]. The patient explained that he has not had much change in regard to his mood and anxiety and depression. The patient noted that he has had an increase of anger. The writer explored this with the patient further and he noted that he is upset about his current situation. The patient noted that it is difficult for him to think about, and this makes him upset. The writer and the patient discussed the importance of discussing these emotions so that he can further process these feelings. Ongoing discussions were had with the patient regarding the establishment of a daily routine as he often finds himself being "bored". He explained that since he has been home, his cigarette smoking has decreased by about 25%. This led to a discussion about smoking cessation. The patient explained that he is uninterested in smoking cessation at this time. The writer will continue to provide the patient with support and encouragement where needed. The patient is scheduled for a follow up appointment on 10/3/2024 at 12pm via telehealth per the patient's request. [Recommended Frequency of Visits: ____] : Recommended frequency of visits: [unfilled] [Return in ____ week(s)] : Return in [unfilled] week(s)

## 2024-10-03 ENCOUNTER — APPOINTMENT (OUTPATIENT)
Dept: PSYCHIATRY | Facility: CLINIC | Age: 49
End: 2024-10-03

## 2024-10-07 ENCOUNTER — APPOINTMENT (OUTPATIENT)
Dept: PSYCHIATRY | Facility: CLINIC | Age: 49
End: 2024-10-07

## 2024-10-07 ENCOUNTER — OUTPATIENT (OUTPATIENT)
Dept: OUTPATIENT SERVICES | Facility: HOSPITAL | Age: 49
LOS: 1 days | Discharge: HOME | End: 2024-10-07
Payer: COMMERCIAL

## 2024-10-07 PROCEDURE — 90832 PSYTX W PT 30 MINUTES: CPT | Mod: 95

## 2024-10-15 ENCOUNTER — APPOINTMENT (OUTPATIENT)
Dept: PSYCHIATRY | Facility: CLINIC | Age: 49
End: 2024-10-15
Payer: COMMERCIAL

## 2024-10-15 ENCOUNTER — OUTPATIENT (OUTPATIENT)
Dept: OUTPATIENT SERVICES | Facility: HOSPITAL | Age: 49
LOS: 1 days | End: 2024-10-15
Payer: COMMERCIAL

## 2024-10-15 DIAGNOSIS — F60.9 PERSONALITY DISORDER, UNSPECIFIED: ICD-10-CM

## 2024-10-15 DIAGNOSIS — F41.9 MAJOR DEPRESSIVE DISORDER, RECURRENT, MODERATE: ICD-10-CM

## 2024-10-15 DIAGNOSIS — F33.1 MAJOR DEPRESSIVE DISORDER, RECURRENT, MODERATE: ICD-10-CM

## 2024-10-15 DIAGNOSIS — F40.01 AGORAPHOBIA WITH PANIC DISORDER: ICD-10-CM

## 2024-10-15 DIAGNOSIS — F41.1 GENERALIZED ANXIETY DISORDER: ICD-10-CM

## 2024-10-15 DIAGNOSIS — F43.10 POST-TRAUMATIC STRESS DISORDER, UNSPECIFIED: ICD-10-CM

## 2024-10-15 DIAGNOSIS — F41.0 GENERALIZED ANXIETY DISORDER: ICD-10-CM

## 2024-10-15 PROCEDURE — 99214 OFFICE O/P EST MOD 30 MIN: CPT | Mod: 95

## 2024-10-15 RX ORDER — PRAZOSIN HYDROCHLORIDE 1 MG/1
1 CAPSULE ORAL
Qty: 30 | Refills: 2 | Status: ACTIVE | COMMUNITY
Start: 2024-10-15 | End: 1900-01-01

## 2024-10-16 DIAGNOSIS — F41.1 GENERALIZED ANXIETY DISORDER: ICD-10-CM

## 2024-10-16 DIAGNOSIS — F60.9 PERSONALITY DISORDER, UNSPECIFIED: ICD-10-CM

## 2024-10-16 DIAGNOSIS — F33.1 MAJOR DEPRESSIVE DISORDER, RECURRENT, MODERATE: ICD-10-CM

## 2024-10-16 DIAGNOSIS — F40.01 AGORAPHOBIA WITH PANIC DISORDER: ICD-10-CM

## 2024-10-17 ENCOUNTER — APPOINTMENT (OUTPATIENT)
Dept: PSYCHIATRY | Facility: CLINIC | Age: 49
End: 2024-10-17

## 2024-10-17 ENCOUNTER — OUTPATIENT (OUTPATIENT)
Dept: OUTPATIENT SERVICES | Facility: HOSPITAL | Age: 49
LOS: 1 days | End: 2024-10-17
Payer: COMMERCIAL

## 2024-10-17 PROCEDURE — 90832 PSYTX W PT 30 MINUTES: CPT

## 2024-10-18 DIAGNOSIS — F43.10 POST-TRAUMATIC STRESS DISORDER, UNSPECIFIED: ICD-10-CM

## 2024-10-24 ENCOUNTER — APPOINTMENT (OUTPATIENT)
Dept: PSYCHIATRY | Facility: CLINIC | Age: 49
End: 2024-10-24

## 2024-11-01 ENCOUNTER — APPOINTMENT (OUTPATIENT)
Dept: PSYCHIATRY | Facility: CLINIC | Age: 49
End: 2024-11-01

## 2024-11-12 ENCOUNTER — APPOINTMENT (OUTPATIENT)
Dept: PSYCHIATRY | Facility: CLINIC | Age: 49
End: 2024-11-12

## 2024-11-14 ENCOUNTER — APPOINTMENT (OUTPATIENT)
Dept: PSYCHIATRY | Facility: CLINIC | Age: 49
End: 2024-11-14

## 2024-11-14 ENCOUNTER — OUTPATIENT (OUTPATIENT)
Dept: OUTPATIENT SERVICES | Facility: HOSPITAL | Age: 49
LOS: 1 days | End: 2024-11-14
Payer: COMMERCIAL

## 2024-11-14 PROCEDURE — 90832 PSYTX W PT 30 MINUTES: CPT

## 2024-11-18 ENCOUNTER — APPOINTMENT (OUTPATIENT)
Dept: PSYCHIATRY | Facility: CLINIC | Age: 49
End: 2024-11-18
Payer: COMMERCIAL

## 2024-11-18 ENCOUNTER — OUTPATIENT (OUTPATIENT)
Dept: OUTPATIENT SERVICES | Facility: HOSPITAL | Age: 49
LOS: 1 days | End: 2024-11-18
Payer: COMMERCIAL

## 2024-11-18 DIAGNOSIS — F43.10 POST-TRAUMATIC STRESS DISORDER, UNSPECIFIED: ICD-10-CM

## 2024-11-18 DIAGNOSIS — F60.9 PERSONALITY DISORDER, UNSPECIFIED: ICD-10-CM

## 2024-11-18 DIAGNOSIS — F41.1 GENERALIZED ANXIETY DISORDER: ICD-10-CM

## 2024-11-18 DIAGNOSIS — F41.0 GENERALIZED ANXIETY DISORDER: ICD-10-CM

## 2024-11-18 PROCEDURE — 99214 OFFICE O/P EST MOD 30 MIN: CPT | Mod: 95

## 2024-11-22 ENCOUNTER — APPOINTMENT (OUTPATIENT)
Dept: PSYCHIATRY | Facility: CLINIC | Age: 49
End: 2024-11-22

## 2024-12-10 ENCOUNTER — OUTPATIENT (OUTPATIENT)
Dept: OUTPATIENT SERVICES | Facility: HOSPITAL | Age: 49
LOS: 1 days | End: 2024-12-10
Payer: COMMERCIAL

## 2024-12-10 ENCOUNTER — APPOINTMENT (OUTPATIENT)
Dept: PSYCHIATRY | Facility: CLINIC | Age: 49
End: 2024-12-10

## 2024-12-10 PROCEDURE — 90832 PSYTX W PT 30 MINUTES: CPT | Mod: 93

## 2024-12-16 ENCOUNTER — APPOINTMENT (OUTPATIENT)
Dept: PSYCHIATRY | Facility: CLINIC | Age: 49
End: 2024-12-16

## 2024-12-17 ENCOUNTER — APPOINTMENT (OUTPATIENT)
Dept: PSYCHIATRY | Facility: CLINIC | Age: 49
End: 2024-12-17
Payer: COMMERCIAL

## 2024-12-17 ENCOUNTER — OUTPATIENT (OUTPATIENT)
Dept: OUTPATIENT SERVICES | Facility: HOSPITAL | Age: 49
LOS: 1 days | End: 2024-12-17
Payer: COMMERCIAL

## 2024-12-17 DIAGNOSIS — F43.10 POST-TRAUMATIC STRESS DISORDER, UNSPECIFIED: ICD-10-CM

## 2024-12-17 DIAGNOSIS — F41.9 MAJOR DEPRESSIVE DISORDER, RECURRENT, MODERATE: ICD-10-CM

## 2024-12-17 DIAGNOSIS — F41.1 GENERALIZED ANXIETY DISORDER: ICD-10-CM

## 2024-12-17 DIAGNOSIS — F33.1 MAJOR DEPRESSIVE DISORDER, RECURRENT, MODERATE: ICD-10-CM

## 2024-12-17 DIAGNOSIS — F41.0 GENERALIZED ANXIETY DISORDER: ICD-10-CM

## 2024-12-17 PROCEDURE — 99214 OFFICE O/P EST MOD 30 MIN: CPT | Mod: 95

## 2024-12-20 ENCOUNTER — OUTPATIENT (OUTPATIENT)
Dept: OUTPATIENT SERVICES | Facility: HOSPITAL | Age: 49
LOS: 1 days | End: 2024-12-20
Payer: COMMERCIAL

## 2024-12-20 ENCOUNTER — APPOINTMENT (OUTPATIENT)
Dept: PSYCHIATRY | Facility: CLINIC | Age: 49
End: 2024-12-20

## 2024-12-20 PROCEDURE — 90832 PSYTX W PT 30 MINUTES: CPT | Mod: 95

## 2024-12-21 DIAGNOSIS — F43.10 POST-TRAUMATIC STRESS DISORDER, UNSPECIFIED: ICD-10-CM

## 2024-12-23 NOTE — REASON FOR VISIT
[Telehealth (audio & video) - Individual/Group] : This visit was provided via telehealth using real-time 2-way audio visual technology. [Home] : The patient, [unfilled], was located at home, [unfilled], at the time of the visit. [Verbal consent obtained from patient/other participant(s)] : Verbal consent for telehealth/telephonic services obtained from patient/other participant(s) yes [OK  to leave message] : OK  to leave message [FreeTextEntry5] : Cantonese  [FreeTextEntry6] : Maxime  [FreeTextEntry7] : he/him/his [Community Based Organization] : Community Based Organization [Non-[facility] Provider/Facility] : Non-[facility] Provider/Facility [Patient] : Patient [Collateral - Name/Contact Info/Relationship:___] : Collateral: [unfilled] [FreeTextEntry2] : evaluation for anxiety,depression and PTSD related to 9/11 exposure  [FreeTextEntry1] : "Since I was certified I have PTSD. I have nightmares sometimes. I have more anxiety" \par

## 2024-12-24 ENCOUNTER — OUTPATIENT (OUTPATIENT)
Dept: OUTPATIENT SERVICES | Facility: HOSPITAL | Age: 49
LOS: 1 days | End: 2024-12-24
Payer: COMMERCIAL

## 2024-12-24 ENCOUNTER — APPOINTMENT (OUTPATIENT)
Dept: PSYCHIATRY | Facility: CLINIC | Age: 49
End: 2024-12-24

## 2024-12-24 DIAGNOSIS — F43.10 POST-TRAUMATIC STRESS DISORDER, UNSPECIFIED: ICD-10-CM

## 2024-12-24 PROCEDURE — 90832 PSYTX W PT 30 MINUTES: CPT

## 2024-12-25 DIAGNOSIS — F43.10 POST-TRAUMATIC STRESS DISORDER, UNSPECIFIED: ICD-10-CM

## 2024-12-31 ENCOUNTER — APPOINTMENT (OUTPATIENT)
Dept: PSYCHIATRY | Facility: CLINIC | Age: 49
End: 2024-12-31

## 2024-12-31 ENCOUNTER — OUTPATIENT (OUTPATIENT)
Dept: OUTPATIENT SERVICES | Facility: HOSPITAL | Age: 49
LOS: 1 days | End: 2024-12-31
Payer: COMMERCIAL

## 2024-12-31 PROCEDURE — 90832 PSYTX W PT 30 MINUTES: CPT

## 2025-01-01 DIAGNOSIS — F43.10 POST-TRAUMATIC STRESS DISORDER, UNSPECIFIED: ICD-10-CM

## 2025-01-03 ENCOUNTER — NON-APPOINTMENT (OUTPATIENT)
Age: 50
End: 2025-01-03

## 2025-01-03 ENCOUNTER — APPOINTMENT (OUTPATIENT)
Dept: PSYCHIATRY | Facility: CLINIC | Age: 50
End: 2025-01-03

## 2025-01-21 ENCOUNTER — APPOINTMENT (OUTPATIENT)
Dept: PSYCHIATRY | Facility: CLINIC | Age: 50
End: 2025-01-21